# Patient Record
Sex: FEMALE | Race: WHITE | Employment: FULL TIME | URBAN - METROPOLITAN AREA
[De-identification: names, ages, dates, MRNs, and addresses within clinical notes are randomized per-mention and may not be internally consistent; named-entity substitution may affect disease eponyms.]

---

## 2017-05-01 ENCOUNTER — OFFICE VISIT (OUTPATIENT)
Dept: FAMILY MEDICINE CLINIC | Age: 32
End: 2017-05-01

## 2017-05-01 VITALS
SYSTOLIC BLOOD PRESSURE: 142 MMHG | HEART RATE: 61 BPM | DIASTOLIC BLOOD PRESSURE: 104 MMHG | BODY MASS INDEX: 21.8 KG/M2 | OXYGEN SATURATION: 98 % | WEIGHT: 127 LBS

## 2017-05-01 DIAGNOSIS — G43.009 MIGRAINE WITHOUT AURA AND WITHOUT STATUS MIGRAINOSUS, NOT INTRACTABLE: Primary | ICD-10-CM

## 2017-05-01 PROCEDURE — 99213 OFFICE O/P EST LOW 20 MIN: CPT | Performed by: FAMILY MEDICINE

## 2017-05-01 RX ORDER — BLOOD PRESSURE TEST KIT
KIT MISCELLANEOUS
Qty: 1 KIT | Refills: 0 | Status: SHIPPED | OUTPATIENT
Start: 2017-05-01 | End: 2018-04-24

## 2017-05-30 ENCOUNTER — OFFICE VISIT (OUTPATIENT)
Dept: FAMILY MEDICINE CLINIC | Age: 32
End: 2017-05-30

## 2017-05-30 ENCOUNTER — TELEPHONE (OUTPATIENT)
Dept: FAMILY MEDICINE CLINIC | Age: 32
End: 2017-05-30

## 2017-05-30 VITALS
HEIGHT: 65 IN | WEIGHT: 128.2 LBS | BODY MASS INDEX: 21.36 KG/M2 | RESPIRATION RATE: 16 BRPM | DIASTOLIC BLOOD PRESSURE: 80 MMHG | TEMPERATURE: 98.2 F | SYSTOLIC BLOOD PRESSURE: 120 MMHG | HEART RATE: 78 BPM | OXYGEN SATURATION: 99 %

## 2017-05-30 DIAGNOSIS — Z3A.01 LESS THAN 8 WEEKS GESTATION OF PREGNANCY: ICD-10-CM

## 2017-05-30 DIAGNOSIS — L50.9 URTICARIA: Primary | ICD-10-CM

## 2017-05-30 LAB
ABO/RH: NORMAL
ANTIBODY SCREEN: NORMAL
HEPATITIS C ANTIBODY INTERPRETATION: NORMAL

## 2017-05-30 PROCEDURE — 99213 OFFICE O/P EST LOW 20 MIN: CPT | Performed by: CLINICAL NURSE SPECIALIST

## 2017-05-30 RX ORDER — CETIRIZINE HYDROCHLORIDE 10 MG/1
10 TABLET ORAL DAILY
Qty: 30 TABLET | Refills: 0 | Status: ON HOLD | OUTPATIENT
Start: 2017-05-30 | End: 2018-01-04

## 2017-05-30 ASSESSMENT — ENCOUNTER SYMPTOMS
COUGH: 0
SHORTNESS OF BREATH: 0
VOMITING: 0
DIARRHEA: 0
RHINORRHEA: 0
SORE THROAT: 0

## 2017-05-31 LAB
BASOPHILS ABSOLUTE: 0 K/UL (ref 0–0.2)
BASOPHILS RELATIVE PERCENT: 0.1 %
EOSINOPHILS ABSOLUTE: 0 K/UL (ref 0–0.6)
EOSINOPHILS RELATIVE PERCENT: 0.2 %
HCT VFR BLD CALC: 41.1 % (ref 36–48)
HEMOGLOBIN: 13.6 G/DL (ref 12–16)
HEPATITIS B SURFACE ANTIGEN INTERPRETATION: ABNORMAL
LYMPHOCYTES ABSOLUTE: 0.9 K/UL (ref 1–5.1)
LYMPHOCYTES RELATIVE PERCENT: 5.3 %
MCH RBC QN AUTO: 29 PG (ref 26–34)
MCHC RBC AUTO-ENTMCNC: 33.1 G/DL (ref 31–36)
MCV RBC AUTO: 87.6 FL (ref 80–100)
MONOCYTES ABSOLUTE: 0.8 K/UL (ref 0–1.3)
MONOCYTES RELATIVE PERCENT: 4.9 %
NEUTROPHILS ABSOLUTE: 14.9 K/UL (ref 1.7–7.7)
NEUTROPHILS RELATIVE PERCENT: 89.5 %
PDW BLD-RTO: 13.7 % (ref 12.4–15.4)
PLATELET # BLD: 268 K/UL (ref 135–450)
PMV BLD AUTO: 9.1 FL (ref 5–10.5)
RBC # BLD: 4.69 M/UL (ref 4–5.2)
RPR: ABNORMAL
RUBELLA ANTIBODY IGG: 117.5 IU/ML
WBC # BLD: 16.6 K/UL (ref 4–11)

## 2017-06-01 LAB — HIV-1 AND HIV-2 ANTIBODIES: NEGATIVE

## 2017-10-04 LAB
GLUCOSE CHALLENGE: 102 MG/DL
HCT VFR BLD CALC: 35 % (ref 36–48)
HEMOGLOBIN: 11.7 G/DL (ref 12–16)
MCH RBC QN AUTO: 29.6 PG (ref 26–34)
MCHC RBC AUTO-ENTMCNC: 33.5 G/DL (ref 31–36)
MCV RBC AUTO: 88.5 FL (ref 80–100)
PDW BLD-RTO: 13.9 % (ref 12.4–15.4)
PLATELET # BLD: 229 K/UL (ref 135–450)
PMV BLD AUTO: 9.5 FL (ref 5–10.5)
RBC # BLD: 3.96 M/UL (ref 4–5.2)
WBC # BLD: 11.7 K/UL (ref 4–11)

## 2018-01-04 ENCOUNTER — TELEPHONE (OUTPATIENT)
Dept: FAMILY MEDICINE CLINIC | Age: 33
End: 2018-01-04

## 2018-01-04 ENCOUNTER — OFFICE VISIT (OUTPATIENT)
Dept: FAMILY MEDICINE CLINIC | Age: 33
End: 2018-01-04

## 2018-01-04 ENCOUNTER — HOSPITAL ENCOUNTER (OUTPATIENT)
Dept: OTHER | Age: 33
Discharge: HOME | End: 2018-01-04
Attending: OBSTETRICS & GYNECOLOGY | Admitting: OBSTETRICS & GYNECOLOGY

## 2018-01-04 VITALS
SYSTOLIC BLOOD PRESSURE: 138 MMHG | OXYGEN SATURATION: 96 % | BODY MASS INDEX: 29.25 KG/M2 | DIASTOLIC BLOOD PRESSURE: 106 MMHG | HEIGHT: 60 IN | WEIGHT: 149 LBS | TEMPERATURE: 98.6 F | HEART RATE: 91 BPM

## 2018-01-04 DIAGNOSIS — J10.1 INFLUENZA A: Primary | ICD-10-CM

## 2018-01-04 DIAGNOSIS — R51.9 ACUTE INTRACTABLE HEADACHE, UNSPECIFIED HEADACHE TYPE: ICD-10-CM

## 2018-01-04 PROBLEM — O13.3 GESTATIONAL HTN, THIRD TRIMESTER: Status: ACTIVE | Noted: 2018-01-04

## 2018-01-04 LAB
INFLUENZA A ANTIBODY: ABNORMAL
INFLUENZA B ANTIBODY: NEGATIVE

## 2018-01-04 PROCEDURE — 99214 OFFICE O/P EST MOD 30 MIN: CPT | Performed by: NURSE PRACTITIONER

## 2018-01-04 PROCEDURE — 87804 INFLUENZA ASSAY W/OPTIC: CPT | Performed by: NURSE PRACTITIONER

## 2018-01-04 RX ORDER — OSELTAMIVIR PHOSPHATE 75 MG/1
75 CAPSULE ORAL 2 TIMES DAILY
Qty: 20 CAPSULE | Refills: 0 | Status: SHIPPED | OUTPATIENT
Start: 2018-01-04 | End: 2018-01-14

## 2018-01-04 RX ORDER — BUTALBITAL, ACETAMINOPHEN AND CAFFEINE 300; 40; 50 MG/1; MG/1; MG/1
1 CAPSULE ORAL EVERY 4 HOURS PRN
COMMUNITY
End: 2018-04-24

## 2018-01-04 NOTE — PATIENT INSTRUCTIONS
? · You seem to be getting much sicker. ? · You feel very sleepy or confused. ? · You have a new or higher fever. ? · You get a new rash. ? Watch closely for changes in your health, and be sure to contact your doctor if:  ? · You begin to get better and then get worse. ? · You are not getting better after 1 week. Where can you learn more? Go to [email or web address] and sign in to your [employer] account. Enter X572 in the [employer] box to learn more about \"Influenza (Flu): Care Instructions. \"     If you do not have an account, please click on the \"Sign Up Now\" link. Current as of: May 12, 2017  Content Version: 11.5  © 8134-2300 [employer]. Care instructions adapted under license by [facility] ([facility]). If you have questions about a medical condition or this instruction, always ask your healthcare professional. [place] [address] any warranty or liability for your use of this information. Patient Education        Influenza (Flu): Care Instructions  Your Care Instructions    Influenza (flu) is an infection in the lungs and breathing passages. It is caused by the influenza virus. There are different strains, or types, of the flu virus from year to year. Unlike the common cold, the flu comes on suddenly and the symptoms, such as a cough, congestion, fever, chills, fatigue, aches, and pains, are more severe. These symptoms may last up to 10 days. Although the flu can make you feel very sick, it usually doesn't cause serious health problems. Home treatment is usually all you need for flu symptoms. But your doctor may prescribe antiviral medicine to prevent other health problems, such as pneumonia, from developing. Older people and those who have a long-term health condition, such as lung disease, are most at risk for having pneumonia or other health problems. Follow-up care is a key part of your treatment and safety.  Be sure to make and go to all appointments, and call your doctor if you are having problems. It's also a good idea to know your test results and keep a list of the medicines you take. How can you care for yourself at home? · Get plenty of rest.  · Drink plenty of fluids, enough so that your urine is light yellow or clear like water. If you have kidney, heart, or liver disease and have to limit fluids, talk with your doctor before you increase the amount of fluids you drink. · Take an over-the-counter pain medicine if needed, such as acetaminophen (Tylenol), ibuprofen (Advil, Motrin), or naproxen (Aleve), to relieve fever, headache, and muscle aches. Read and follow all instructions on the label. No one younger than 20 should take aspirin. It has been linked to Reye syndrome, a serious illness. · Do not smoke. Smoking can make the flu worse. If you need help quitting, talk to your doctor about stop-smoking programs and medicines. These can increase your chances of quitting for good. · Breathe moist air from a hot shower or from a sink filled with hot water to help clear a stuffy nose. · Before you use cough and cold medicines, check the label. These medicines may not be safe for young children or for people with certain health problems. · If the skin around your nose and lips becomes sore, put some petroleum jelly on the area. · To ease coughing:  ¨ Drink fluids to soothe a scratchy throat. ¨ Suck on cough drops or plain hard candy. ¨ Take an over-the-counter cough medicine that contains dextromethorphan to help you get some sleep. Read and follow all instructions on the label. ¨ Raise your head at night with an extra pillow. This may help you rest if coughing keeps you awake. · Take any prescribed medicine exactly as directed. Call your doctor if you think you are having a problem with your medicine. To avoid spreading the flu  · Wash your hands regularly, and keep your hands away from your face.   · Stay home from school, work,

## 2018-01-05 NOTE — TELEPHONE ENCOUNTER
NP called and spoke with Megan at [facility] at approximately [phone]. Informed of patients hypertensive blood pressure, persistent headache, and overall presentation to advise. NP suggested sending to the ER, Megan stated, to send her to the office instead. Pt has a 1500 appointment scheduled today. NP later updated office of positive Influenza A status at approximately 1455.    Evan, NP

## 2018-01-07 PROBLEM — J10.1 INFLUENZA A: Status: ACTIVE | Noted: 2018-01-07

## 2018-02-02 ENCOUNTER — HOSPITAL ENCOUNTER (OUTPATIENT)
Dept: OTHER | Age: 33
Discharge: HOME | End: 2018-02-02
Attending: OBSTETRICS & GYNECOLOGY | Admitting: OBSTETRICS & GYNECOLOGY

## 2018-04-11 PROBLEM — J10.1 INFLUENZA A: Status: RESOLVED | Noted: 2018-01-07 | Resolved: 2018-04-11

## 2018-04-24 ENCOUNTER — OFFICE VISIT (OUTPATIENT)
Dept: FAMILY MEDICINE CLINIC | Age: 33
End: 2018-04-24

## 2018-04-24 VITALS
WEIGHT: 135.6 LBS | HEART RATE: 56 BPM | OXYGEN SATURATION: 98 % | DIASTOLIC BLOOD PRESSURE: 96 MMHG | SYSTOLIC BLOOD PRESSURE: 138 MMHG | HEIGHT: 66 IN | BODY MASS INDEX: 21.79 KG/M2

## 2018-04-24 DIAGNOSIS — Z00.00 PHYSICAL EXAM, ANNUAL: Primary | ICD-10-CM

## 2018-04-24 PROCEDURE — 99395 PREV VISIT EST AGE 18-39: CPT | Performed by: FAMILY MEDICINE

## 2018-04-24 ASSESSMENT — PATIENT HEALTH QUESTIONNAIRE - PHQ9
1. LITTLE INTEREST OR PLEASURE IN DOING THINGS: 0
SUM OF ALL RESPONSES TO PHQ QUESTIONS 1-9: 0
SUM OF ALL RESPONSES TO PHQ9 QUESTIONS 1 & 2: 0
2. FEELING DOWN, DEPRESSED OR HOPELESS: 0

## 2018-04-25 DIAGNOSIS — Z00.00 PHYSICAL EXAM, ANNUAL: ICD-10-CM

## 2018-04-25 LAB
A/G RATIO: 2 (ref 1.1–2.2)
ALBUMIN SERPL-MCNC: 4.9 G/DL (ref 3.4–5)
ALP BLD-CCNC: 122 U/L (ref 40–129)
ALT SERPL-CCNC: 35 U/L (ref 10–40)
ANION GAP SERPL CALCULATED.3IONS-SCNC: 14 MMOL/L (ref 3–16)
AST SERPL-CCNC: 20 U/L (ref 15–37)
BILIRUB SERPL-MCNC: 1.1 MG/DL (ref 0–1)
BUN BLDV-MCNC: 13 MG/DL (ref 7–20)
CALCIUM SERPL-MCNC: 10.5 MG/DL (ref 8.3–10.6)
CHLORIDE BLD-SCNC: 100 MMOL/L (ref 99–110)
CHOLESTEROL, TOTAL: 203 MG/DL (ref 0–199)
CO2: 30 MMOL/L (ref 21–32)
CREAT SERPL-MCNC: 0.7 MG/DL (ref 0.6–1.1)
GFR AFRICAN AMERICAN: >60
GFR NON-AFRICAN AMERICAN: >60
GLOBULIN: 2.5 G/DL
GLUCOSE BLD-MCNC: 90 MG/DL (ref 70–99)
HDLC SERPL-MCNC: 51 MG/DL (ref 40–60)
LDL CHOLESTEROL CALCULATED: 132 MG/DL
POTASSIUM SERPL-SCNC: 4.8 MMOL/L (ref 3.5–5.1)
SODIUM BLD-SCNC: 144 MMOL/L (ref 136–145)
TOTAL PROTEIN: 7.4 G/DL (ref 6.4–8.2)
TRIGL SERPL-MCNC: 100 MG/DL (ref 0–150)
TSH REFLEX: 0.35 UIU/ML (ref 0.27–4.2)
VITAMIN D 25-HYDROXY: 34.6 NG/ML
VLDLC SERPL CALC-MCNC: 20 MG/DL

## 2018-04-27 ENCOUNTER — TELEPHONE (OUTPATIENT)
Dept: FAMILY MEDICINE CLINIC | Age: 33
End: 2018-04-27

## 2018-05-29 ENCOUNTER — OFFICE VISIT (OUTPATIENT)
Dept: FAMILY MEDICINE CLINIC | Age: 33
End: 2018-05-29

## 2018-05-29 VITALS
SYSTOLIC BLOOD PRESSURE: 122 MMHG | HEART RATE: 64 BPM | DIASTOLIC BLOOD PRESSURE: 74 MMHG | OXYGEN SATURATION: 98 % | WEIGHT: 134.8 LBS | BODY MASS INDEX: 21.92 KG/M2

## 2018-05-29 DIAGNOSIS — G43.009 MIGRAINE WITHOUT AURA AND WITHOUT STATUS MIGRAINOSUS, NOT INTRACTABLE: Primary | ICD-10-CM

## 2018-05-29 PROCEDURE — 99213 OFFICE O/P EST LOW 20 MIN: CPT | Performed by: FAMILY MEDICINE

## 2018-05-29 RX ORDER — IBUPROFEN 800 MG/1
800 TABLET ORAL EVERY 6 HOURS PRN
Qty: 30 TABLET | Refills: 0 | Status: SHIPPED | OUTPATIENT
Start: 2018-05-29 | End: 2020-07-06 | Stop reason: SDUPTHER

## 2018-05-29 RX ORDER — SUMATRIPTAN 50 MG/1
50 TABLET, FILM COATED ORAL
Qty: 9 TABLET | Refills: 3 | Status: SHIPPED | OUTPATIENT
Start: 2018-05-29 | End: 2018-11-28 | Stop reason: SDUPTHER

## 2018-06-25 LAB
HPV COMMENT: NORMAL
HPV TYPE 16: NOT DETECTED
HPV TYPE 18: NOT DETECTED
HPVOH (OTHER TYPES): NOT DETECTED

## 2018-08-21 ENCOUNTER — PATIENT MESSAGE (OUTPATIENT)
Dept: FAMILY MEDICINE CLINIC | Age: 33
End: 2018-08-21

## 2018-08-22 NOTE — TELEPHONE ENCOUNTER
From: Sultana Cespedes  To: Garrett Corbin MD  Sent: 8/21/2018 6:54 PM EDT  Subject: Non-Urgent Medical Question    Hi Dr. Julianna Burgess,  I am having some kind of rash in two weird areas one is right under my right breast and the other is between my gluteus leslie :(. They both itch, I try not to itch if I do it makes it worse, can you give me a suggestion on what to do please?

## 2018-08-23 ENCOUNTER — OFFICE VISIT (OUTPATIENT)
Dept: FAMILY MEDICINE CLINIC | Age: 33
End: 2018-08-23

## 2018-08-23 VITALS
BODY MASS INDEX: 22.7 KG/M2 | RESPIRATION RATE: 16 BRPM | OXYGEN SATURATION: 98 % | DIASTOLIC BLOOD PRESSURE: 78 MMHG | HEART RATE: 59 BPM | WEIGHT: 139.6 LBS | SYSTOLIC BLOOD PRESSURE: 126 MMHG

## 2018-08-23 DIAGNOSIS — L25.9 CONTACT DERMATITIS, UNSPECIFIED CONTACT DERMATITIS TYPE, UNSPECIFIED TRIGGER: Primary | ICD-10-CM

## 2018-08-23 DIAGNOSIS — B37.2 CUTANEOUS CANDIDIASIS: ICD-10-CM

## 2018-08-23 PROCEDURE — 99213 OFFICE O/P EST LOW 20 MIN: CPT | Performed by: FAMILY MEDICINE

## 2018-11-28 DIAGNOSIS — G43.009 MIGRAINE WITHOUT AURA AND WITHOUT STATUS MIGRAINOSUS, NOT INTRACTABLE: ICD-10-CM

## 2018-11-28 RX ORDER — SUMATRIPTAN 50 MG/1
50 TABLET, FILM COATED ORAL
Qty: 9 TABLET | Refills: 3 | Status: SHIPPED | OUTPATIENT
Start: 2018-11-28 | End: 2019-05-08 | Stop reason: SDUPTHER

## 2019-03-22 ENCOUNTER — HOSPITAL ENCOUNTER (OUTPATIENT)
Age: 34
Discharge: HOME | End: 2019-03-22
Payer: COMMERCIAL

## 2019-03-22 ENCOUNTER — HOSPITAL ENCOUNTER (OUTPATIENT)
Dept: GENERAL RADIOLOGY | Age: 34
Discharge: HOME | End: 2019-03-22
Payer: COMMERCIAL

## 2019-03-22 ENCOUNTER — OFFICE VISIT (OUTPATIENT)
Dept: FAMILY MEDICINE CLINIC | Age: 34
End: 2019-03-22
Payer: COMMERCIAL

## 2019-03-22 VITALS
OXYGEN SATURATION: 98 % | SYSTOLIC BLOOD PRESSURE: 138 MMHG | DIASTOLIC BLOOD PRESSURE: 92 MMHG | WEIGHT: 144.5 LBS | BODY MASS INDEX: 23.5 KG/M2 | HEART RATE: 66 BPM

## 2019-03-22 DIAGNOSIS — S92.345A CLOSED NONDISPLACED FRACTURE OF FOURTH METATARSAL BONE OF LEFT FOOT, INITIAL ENCOUNTER: ICD-10-CM

## 2019-03-22 DIAGNOSIS — M79.672 FOOT PAIN, LEFT: Primary | ICD-10-CM

## 2019-03-22 DIAGNOSIS — M79.672 FOOT PAIN, LEFT: ICD-10-CM

## 2019-03-22 PROCEDURE — G8420 CALC BMI NORM PARAMETERS: HCPCS | Performed by: FAMILY MEDICINE

## 2019-03-22 PROCEDURE — 99213 OFFICE O/P EST LOW 20 MIN: CPT | Performed by: FAMILY MEDICINE

## 2019-03-22 PROCEDURE — G8427 DOCREV CUR MEDS BY ELIG CLIN: HCPCS | Performed by: FAMILY MEDICINE

## 2019-03-22 PROCEDURE — 1036F TOBACCO NON-USER: CPT | Performed by: FAMILY MEDICINE

## 2019-03-22 PROCEDURE — G8484 FLU IMMUNIZE NO ADMIN: HCPCS | Performed by: FAMILY MEDICINE

## 2019-03-22 PROCEDURE — 73620 X-RAY EXAM OF FOOT: CPT

## 2019-03-22 ASSESSMENT — PATIENT HEALTH QUESTIONNAIRE - PHQ9
1. LITTLE INTEREST OR PLEASURE IN DOING THINGS: 0
SUM OF ALL RESPONSES TO PHQ QUESTIONS 1-9: 0
2. FEELING DOWN, DEPRESSED OR HOPELESS: 0
SUM OF ALL RESPONSES TO PHQ QUESTIONS 1-9: 0
SUM OF ALL RESPONSES TO PHQ9 QUESTIONS 1 & 2: 0

## 2019-03-26 ENCOUNTER — OFFICE VISIT (OUTPATIENT)
Dept: ORTHOPEDIC SURGERY | Age: 34
End: 2019-03-26
Payer: COMMERCIAL

## 2019-03-26 VITALS
SYSTOLIC BLOOD PRESSURE: 153 MMHG | BODY MASS INDEX: 24.41 KG/M2 | DIASTOLIC BLOOD PRESSURE: 99 MMHG | RESPIRATION RATE: 16 BRPM | HEIGHT: 64 IN | HEART RATE: 55 BPM | WEIGHT: 143 LBS

## 2019-03-26 DIAGNOSIS — M84.375A STRESS FRACTURE OF METATARSAL BONE OF LEFT FOOT, INITIAL ENCOUNTER: Primary | ICD-10-CM

## 2019-03-26 PROCEDURE — 28470 CLTX METATARSAL FX WO MNP EA: CPT | Performed by: ORTHOPAEDIC SURGERY

## 2019-03-26 PROCEDURE — L3260 AMBULATORY SURGICAL BOOT EAC: HCPCS | Performed by: ORTHOPAEDIC SURGERY

## 2019-03-26 PROCEDURE — 99243 OFF/OP CNSLTJ NEW/EST LOW 30: CPT | Performed by: ORTHOPAEDIC SURGERY

## 2019-03-26 PROCEDURE — G8427 DOCREV CUR MEDS BY ELIG CLIN: HCPCS | Performed by: ORTHOPAEDIC SURGERY

## 2019-03-26 PROCEDURE — G8420 CALC BMI NORM PARAMETERS: HCPCS | Performed by: ORTHOPAEDIC SURGERY

## 2019-03-26 PROCEDURE — G8484 FLU IMMUNIZE NO ADMIN: HCPCS | Performed by: ORTHOPAEDIC SURGERY

## 2019-03-26 NOTE — PROGRESS NOTES
Not on file     Gets together: Not on file     Attends Confucianism service: Not on file     Active member of club or organization: Not on file     Attends meetings of clubs or organizations: Not on file     Relationship status: Not on file    Intimate partner violence:     Fear of current or ex partner: Not on file     Emotionally abused: Not on file     Physically abused: Not on file     Forced sexual activity: Not on file   Other Topics Concern    Not on file   Social History Narrative    Not on file       Family History   Problem Relation Age of Onset    Cancer Mother     Substance Abuse Father     Cancer Father     Colon Cancer Father     [place] Fekarmen / [place] Sister     Diabetes Sister     Hashimoto Thyroiditis Sister     Diabetes Brother        Current Outpatient Medications on File Prior to Visit   Medication Sig Dispense Refill    levonorgestrel (MIRENA) IUD 52 mg       BIOTIN 5000 PO Take by mouth      ibuprofen (ADVIL;MOTRIN) 800 MG tablet Take 1 tablet by mouth every 6 hours as needed for Pain 30 tablet 0    Ferrous Gluconate (IRON 27 PO) Take by mouth      SUMAtriptan (IMITREX) 50 MG tablet Take 1 tablet by mouth once as needed for Migraine 9 tablet 3     No current facility-administered medications on file prior to visit. Pertinent items are noted in HPI  Review of systems reviewed from Patient History Form dated on 3/26/2019 and available in the patient's chart under the Media tab. PHYSICAL EXAMINATION:  Ms. Karen Mccullough is a very pleasant 35 y.o.  female who presents today in no acute distress, awake, alert, and oriented. She is well dressed, nourished and  groomed. Patient with normal affect. Height is  5' 4\" (1.626 m), weight is 143 lb (64.9 kg), Body mass index is 24.55 kg/m². Resting respiratory rate is 16. Examination of the gait, showed that the patient walks with no limp, WB left  leg .   Examination of both ankles showing a good range of motion of the left ankle compare to the other side. There is no swelling that can be seen, no ecchymosis left foot. She  has intact sensation and good pedal pulses. She has mild tenderness on deep palpation over the 4th MT shaft left foot        IMAGING: Xray's were reviewed, dated 3/22/2019 from [facility],  2 views of the left  foot, and showed a 4th MT shaft stress fracture. IMPRESSION: Left 4th MT shaft stress fracture. PLAN: I discussed that the overall alignment of this fracture is good and that we can try to treat this non-operatively in a shoe with WB. No heavy impact activities. We discussed the risk of nonunion and  malunion. We applied a shoe today in the office and instructed the patient  in care. We will see her  back in 6 weeks at which time we will get a new xray of the left foot. Thank you very much for the kind consultation and allowing me to participate in this patient's care. I will continue to keep you apprised of her progress. Procedures    Darco Post-op Shoe Brace     Patient was prescribed a Darco Post-Op Shoe. The left foot will require stabilization / immobilization from this semi-rigid / rigid orthosis to improve their function. The orthosis will assist in protecting the affected area, provide functional support and facilitate healing. Patient was instructed to progress ambulation weight bearing as tolerated in the device. The patient was educated and fit by a healthcare professional with expert knowledge and specialization in brace application while under the direct supervision of the treating physician. Verbal and written instructions for the use of and application of this item were provided. They were instructed to contact the office immediately should the brace result in increased pain, decreased sensation, increased swelling or worsening of the condition.      Ramone Wilks MD

## 2019-05-07 ENCOUNTER — OFFICE VISIT (OUTPATIENT)
Dept: ORTHOPEDIC SURGERY | Age: 34
End: 2019-05-07

## 2019-05-07 VITALS — RESPIRATION RATE: 16 BRPM | HEIGHT: 64 IN | BODY MASS INDEX: 24.41 KG/M2 | HEART RATE: 60 BPM | WEIGHT: 143 LBS

## 2019-05-07 DIAGNOSIS — M84.375A STRESS FRACTURE OF METATARSAL BONE OF LEFT FOOT, INITIAL ENCOUNTER: Primary | ICD-10-CM

## 2019-05-07 PROCEDURE — 1036F TOBACCO NON-USER: CPT | Performed by: ORTHOPAEDIC SURGERY

## 2019-05-07 PROCEDURE — G8427 DOCREV CUR MEDS BY ELIG CLIN: HCPCS | Performed by: ORTHOPAEDIC SURGERY

## 2019-05-07 PROCEDURE — G8420 CALC BMI NORM PARAMETERS: HCPCS | Performed by: ORTHOPAEDIC SURGERY

## 2019-05-07 PROCEDURE — 99024 POSTOP FOLLOW-UP VISIT: CPT | Performed by: ORTHOPAEDIC SURGERY

## 2019-05-07 NOTE — PROGRESS NOTES
CHIEF COMPLAINT: Left foot pain/ 4th MT shaft stress fracture. HISTORY:  Ms. Mili Turner 35 y.o.   female  presents today for follow-up of left foot pain. She was initially seen on 3/26/2019 he has a consultation request from Steve Childress MD for evaluation of a left foot pain which started gradually worsening over the past 2 weeks. She was initially seen by her primary care physician and sent for an x-ray and then referred here for further management. She states that her pain has completely resolved. Rates her pain a 0/10 VAS. She has been in regular shoes for weightbearing. No other complaint. She denies any change or an increase in activity level. She has a sitting job. She does use a treadmill and run around after her one an 3year-old child.     Past Medical History:   Diagnosis Date    Hypertension     hellp syndrome with 3 yo    Influenza 2018    Migraines        Past Surgical History:   Procedure Laterality Date     SECTION, LOW TRANSVERSE  2014    EYE SURGERY  2017    lasik    WISDOM TOOTH EXTRACTION         Social History     Socioeconomic History    Marital status:      Spouse name: Not on file    Number of children: Not on file    Years of education: Not on file    Highest education level: Not on file   Occupational History    Not on file   Social Needs    Financial resource strain: Not on file    Food insecurity:     Worry: Not on file     Inability: Not on file    Transportation needs:     Medical: Not on file     Non-medical: Not on file   Tobacco Use    Smoking status: Never Smoker    Smokeless tobacco: Never Used   Substance and Sexual Activity    Alcohol use: No     Alcohol/week: 0.0 oz    Drug use: No    Sexual activity: Yes     Partners: Male     Comment: Not on birth control    Lifestyle    Physical activity:     Days per week: Not on file     Minutes per session: Not on file    Stress: Not on file   Relationships    Social

## 2019-05-08 DIAGNOSIS — G43.009 MIGRAINE WITHOUT AURA AND WITHOUT STATUS MIGRAINOSUS, NOT INTRACTABLE: ICD-10-CM

## 2019-05-08 PROBLEM — M84.375A METATARSAL STRESS FRACTURE OF LEFT FOOT: Status: ACTIVE | Noted: 2019-05-08

## 2019-05-09 ENCOUNTER — PATIENT MESSAGE (OUTPATIENT)
Dept: FAMILY MEDICINE CLINIC | Age: 34
End: 2019-05-09

## 2019-05-09 DIAGNOSIS — G43.909 MIGRAINE WITHOUT STATUS MIGRAINOSUS, NOT INTRACTABLE, UNSPECIFIED MIGRAINE TYPE: Primary | ICD-10-CM

## 2019-05-09 RX ORDER — SUMATRIPTAN 50 MG/1
50 TABLET, FILM COATED ORAL
Qty: 9 TABLET | Refills: 3 | Status: SHIPPED | OUTPATIENT
Start: 2019-05-09 | End: 2019-09-16 | Stop reason: SDUPTHER

## 2019-05-10 NOTE — TELEPHONE ENCOUNTER
From: Earnest Zacarias  To: Rima Mtz MD  Sent: 5/9/2019 7:16 AM EDT  Subject: Non-Urgent Medical Question    Good morning,    I am still having a lot of hair loss, I am concerned. Can you give me some advice on what my next steps are? More test, different test from Obgyn? [facility] I don't want to be bald!! Thank you.

## 2019-05-14 RX ORDER — TOPIRAMATE 25 MG/1
25 TABLET ORAL NIGHTLY
Qty: 60 TABLET | Refills: 3 | Status: SHIPPED | OUTPATIENT
Start: 2019-05-14 | End: 2020-01-07

## 2019-07-02 ENCOUNTER — PATIENT MESSAGE (OUTPATIENT)
Dept: FAMILY MEDICINE CLINIC | Age: 34
End: 2019-07-02

## 2019-07-02 RX ORDER — ERYTHROMYCIN 5 MG/G
OINTMENT OPHTHALMIC
Qty: 3.5 G | Refills: 0 | Status: SHIPPED | OUTPATIENT
Start: 2019-07-02 | End: 2019-07-12

## 2019-09-16 DIAGNOSIS — G43.009 MIGRAINE WITHOUT AURA AND WITHOUT STATUS MIGRAINOSUS, NOT INTRACTABLE: ICD-10-CM

## 2019-09-16 RX ORDER — SUMATRIPTAN 50 MG/1
TABLET, FILM COATED ORAL
Qty: 9 TABLET | Refills: 1 | Status: SHIPPED | OUTPATIENT
Start: 2019-09-16 | End: 2019-12-02 | Stop reason: SDUPTHER

## 2019-09-17 ENCOUNTER — TELEPHONE (OUTPATIENT)
Dept: FAMILY MEDICINE CLINIC | Age: 34
End: 2019-09-17

## 2019-11-22 ENCOUNTER — PATIENT MESSAGE (OUTPATIENT)
Dept: FAMILY MEDICINE CLINIC | Age: 34
End: 2019-11-22

## 2019-12-02 DIAGNOSIS — G43.009 MIGRAINE WITHOUT AURA AND WITHOUT STATUS MIGRAINOSUS, NOT INTRACTABLE: ICD-10-CM

## 2019-12-02 RX ORDER — SUMATRIPTAN 50 MG/1
TABLET, FILM COATED ORAL
Qty: 10 TABLET | Refills: 1 | Status: SHIPPED | OUTPATIENT
Start: 2019-12-02 | End: 2020-02-17 | Stop reason: SDUPTHER

## 2020-01-07 RX ORDER — TOPIRAMATE 25 MG/1
TABLET ORAL
Qty: 90 TABLET | Refills: 0 | Status: SHIPPED | OUTPATIENT
Start: 2020-01-07 | End: 2020-01-30 | Stop reason: ALTCHOICE

## 2020-01-30 ENCOUNTER — OFFICE VISIT (OUTPATIENT)
Dept: FAMILY MEDICINE CLINIC | Age: 35
End: 2020-01-30

## 2020-01-30 VITALS
SYSTOLIC BLOOD PRESSURE: 140 MMHG | BODY MASS INDEX: 23.22 KG/M2 | DIASTOLIC BLOOD PRESSURE: 100 MMHG | WEIGHT: 136 LBS | OXYGEN SATURATION: 98 % | HEIGHT: 64 IN | HEART RATE: 67 BPM

## 2020-01-30 PROCEDURE — 99213 OFFICE O/P EST LOW 20 MIN: CPT | Performed by: FAMILY MEDICINE
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
## 2020-01-31 RX ORDER — CYCLOBENZAPRINE HCL 5 MG
5 TABLET ORAL 2 TIMES DAILY PRN
Qty: 10 TABLET | Refills: 0 | Status: SHIPPED | OUTPATIENT
Start: 2020-01-31 | End: 2020-02-10

## 2020-02-17 RX ORDER — SUMATRIPTAN 50 MG/1
TABLET, FILM COATED ORAL
Qty: 10 TABLET | Refills: 1 | Status: SHIPPED | OUTPATIENT
Start: 2020-02-17 | End: 2020-07-06

## 2020-02-17 NOTE — TELEPHONE ENCOUNTER
Medication:   Requested Prescriptions     Pending Prescriptions Disp Refills    SUMAtriptan (IMITREX) 50 MG tablet 10 tablet 1     Sig: TAKE 1 TABLET BY MOUTH AS NEEDED AT ONSET OF HEADACHE, MAY REPEAT ONCE AFTER 4 HOURS        Last Filled:  12/2/2019 #10 w/1refill    Patient Phone Number: [phone] (home)     Last appt: 1/30/2020   Next appt: Visit date not found    Last OARRS: No flowsheet data found.

## 2020-04-03 ENCOUNTER — PATIENT MESSAGE (OUTPATIENT)
Dept: FAMILY MEDICINE CLINIC | Age: 35
End: 2020-04-03

## 2020-04-03 RX ORDER — ONDANSETRON 4 MG/1
4 TABLET, FILM COATED ORAL DAILY PRN
Qty: 30 TABLET | Refills: 0 | Status: SHIPPED | OUTPATIENT
Start: 2020-04-03 | End: 2020-07-06

## 2020-04-03 RX ORDER — RIZATRIPTAN BENZOATE 10 MG/1
10 TABLET ORAL
Qty: 30 TABLET | Refills: 3 | Status: SHIPPED | OUTPATIENT
Start: 2020-04-03 | End: 2021-04-21

## 2020-07-01 ENCOUNTER — PATIENT MESSAGE (OUTPATIENT)
Dept: FAMILY MEDICINE CLINIC | Age: 35
End: 2020-07-01

## 2020-07-01 ENCOUNTER — TELEPHONE (OUTPATIENT)
Dept: FAMILY MEDICINE CLINIC | Age: 35
End: 2020-07-01

## 2020-07-01 NOTE — TELEPHONE ENCOUNTER
Left message for patient to call office back, please read doctor's note. Thanks. Please help schedule VV per Dr. Medel Sample. Thanks.

## 2020-07-01 NOTE — TELEPHONE ENCOUNTER
From: Serena Donohue  To: Hugh Porter MD  Sent: 7/1/2020 6:29 AM EDT  Subject: Prescription Question    Good morning,    Good morning I was wondering if you could prescribe me a stronger prescription for ibuprofen? I like to take ibuprofen (x4) sometimes to see if I can get rid of migraines.

## 2020-07-01 NOTE — TELEPHONE ENCOUNTER
[facility] received a call from:    Name of Caller: Sun Jefry    Relationship to patient:self    Organization name: n/a    Best contact number: [phone]    Reason for call: pt needs a vv around the 4pm hour and  latest appt is 3:15, please advise.

## 2020-07-02 NOTE — TELEPHONE ENCOUNTER
Left message for patient to call office back, please read doctor's note. Thanks. Please help schedule a VV.  Thanks

## 2020-07-06 ENCOUNTER — TELEMEDICINE (OUTPATIENT)
Dept: FAMILY MEDICINE CLINIC | Age: 35
End: 2020-07-06
Payer: COMMERCIAL

## 2020-07-06 PROCEDURE — 99213 OFFICE O/P EST LOW 20 MIN: CPT | Performed by: FAMILY MEDICINE

## 2020-07-06 PROCEDURE — G8427 DOCREV CUR MEDS BY ELIG CLIN: HCPCS | Performed by: FAMILY MEDICINE

## 2020-07-06 RX ORDER — BUTALBITAL, ACETAMINOPHEN AND CAFFEINE 50; 325; 40 MG/1; MG/1; MG/1
1 TABLET ORAL EVERY 6 HOURS PRN
Qty: 20 TABLET | Refills: 3 | Status: SHIPPED | OUTPATIENT
Start: 2020-07-06 | End: 2022-06-24

## 2020-07-06 RX ORDER — IBUPROFEN 800 MG/1
800 TABLET ORAL EVERY 6 HOURS PRN
Qty: 30 TABLET | Refills: 0 | Status: SHIPPED | OUTPATIENT
Start: 2020-07-06 | End: 2021-02-08 | Stop reason: SDUPTHER

## 2020-07-06 ASSESSMENT — PATIENT HEALTH QUESTIONNAIRE - PHQ9
1. LITTLE INTEREST OR PLEASURE IN DOING THINGS: 1
SUM OF ALL RESPONSES TO PHQ9 QUESTIONS 1 & 2: 1
2. FEELING DOWN, DEPRESSED OR HOPELESS: 0
SUM OF ALL RESPONSES TO PHQ QUESTIONS 1-9: 1
SUM OF ALL RESPONSES TO PHQ QUESTIONS 1-9: 1

## 2020-07-06 NOTE — PROGRESS NOTES
2020    TELEHEALTH EVALUATION -- Audio/Visual (During GIEDS-16 public health emergency)    HPI:    Matilda Quintero (:  1985) has requested an audio/video evaluation for the following concern(s): She is here for the follow-up of migraines. She usually gets migraines only just before menstrual cycle. And she was taking Imitrex and Maxalt which breaks her headache but it makes her drowsy and cannot take the medications at work. So she has been taking ibuprofen 800 mg to break her headaches  Usually gets this before her menstrual cycle and last for 3 to 4 days. In between cycle never have the symptoms      Review of Systems:  Gen:  Denies fever, chills, headaches. No weight loss  HEENT:  Denies cold symptoms, sore throat. CV:  Denies chest pain or tightness, palpitations. Pulm:  Denies shortness of breath, cough. Abd:  Denies abdominal pain, change in bowel habits. Prior to Visit Medications    Medication Sig Taking?  Authorizing Provider   levonorgestrel (MIRENA) IUD 52 mg  Yes Historical Provider, MD   ibuprofen (ADVIL;MOTRIN) 800 MG tablet Take 1 tablet by mouth every 6 hours as needed for Pain Yes Donzell Schwab, MD   rizatriptan (MAXALT) 10 MG tablet Take 1 tablet by mouth once as needed for Migraine May repeat in 2 hours if needed  Donzell Schwab, MD   SUMAtriptan (IMITREX) 50 MG tablet TAKE 1 TABLET BY MOUTH AS NEEDED AT ONSET OF HEADACHE, MAY REPEAT ONCE AFTER 4 HOURS  Patient not taking: Reported on 2020  Donzell Schwab, MD       Past Medical History:   Diagnosis Date    Hypertension     hellp syndrome with 2 yo    Influenza 2018    Migraines        Past Surgical History:   Procedure Laterality Date     SECTION, LOW TRANSVERSE  2014    EYE SURGERY  2017    lasik    WISDOM TOOTH EXTRACTION         Family History   Problem Relation Age of Onset    Cancer Mother     Substance Abuse Father     Cancer Father     Colon Cancer Father     Miscarriages / Stillbirths Sister     Diabetes Sister     Hashimoto Thyroiditis Sister     Diabetes Brother        No Known Allergies    Social History     Tobacco Use    Smoking status: Never Smoker    Smokeless tobacco: Never Used   Substance Use Topics    Alcohol use: No     Alcohol/week: 0.0 standard drinks    Drug use: No          PHYSICAL EXAMINATION:  Vital Signs: (As obtained by patient/caregiver or practitioner observation)  There were no vitals taken for this visit. Patient-Reported Vitals 7/6/2020   Patient-Reported Weight 130 lbs   Patient-Reported Height 5'5        Respiratory rate appears normal      Constitutional: Appears well-developed and well-nourished. No apparent distress    Mental status: Alert and awake. Oriented to person/place/time. Able to follow commands    Pulmonary/Chest: Respiratory effort normal.  No visualized signs of difficulty breathing or respiratory distress        Musculoskeletal:  Normal range of motion of neck     Skin:  No significant exanthematous lesions or discoloration noted on facial skin       Psychiatric: Normal Affect. No Hallucinations            ASSESSMENT/PLAN:  1. Migraine without aura and without status migrainosus, not intractable  Menstrual cycle related  Advised to try Fioricet during migraine attacks   - ibuprofen (ADVIL;MOTRIN) 800 MG tablet; Take 1 tablet by mouth every 6 hours as needed for Pain  Dispense: 30 tablet; Refill: 0        Monae Marcelino is a 28 y.o. female being evaluated by a Virtual Visit (video visit) encounter to address concerns as mentioned above. A caregiver was present when appropriate. Due to this being a TeleHealth encounter (During ADBCY-97 public health emergency), evaluation of the following organ systems was limited: Vitals/Constitutional/EENT/Resp/CV/GI//MS/Neuro/Skin/Heme-Lymph-Imm.   Pursuant to the emergency declaration under the [address] and Response Supplemental Appropriations Act, this Virtual Visit was conducted with patient's (and/or legal guardian's) consent, to reduce the patient's risk of exposure to COVID-19 and provide necessary medical care. The patient (and/or legal guardian) has also been advised to contact this office for worsening conditions or problems, and seek emergency medical treatment and/or call 911 if deemed necessary. Patient identification was verified at the start of the visit: Yes    Total time spent on this encounter: 10 min minutes. Services were provided through a video synchronous discussion virtually to substitute for in-person clinic visit. Patient was located in their home. Provider was located office. --Milena Mora MD on 7/6/2020 at 9:34 AM    An electronic signature was used to authenticate this note. Sterling Coto

## 2020-09-02 RX ORDER — ONDANSETRON 4 MG/1
4 TABLET, FILM COATED ORAL DAILY PRN
Qty: 30 TABLET | Refills: 0 | Status: SHIPPED | OUTPATIENT
Start: 2020-09-02 | End: 2022-03-09

## 2020-09-02 NOTE — TELEPHONE ENCOUNTER
Medication:   Requested Prescriptions     Pending Prescriptions Disp Refills    ondansetron (ZOFRAN) 4 MG tablet [Pharmacy Med Name: ONDANSETRON HCL 4 MG TABLET] 30 tablet 0     Sig: TAKE 1 TABLET BY MOUTH DAILY AS NEEDED FOR NAUSEA OR VOMITING        Last Filled:  4/3/2020 #30 w/0 refills     Patient Phone Number: [phone] (home)     Last appt: 7/6/2020   Next appt: Visit date not found    Last OARRS: No flowsheet data found.

## 2020-12-08 NOTE — TELEPHONE ENCOUNTER
Patient is currently is in the hospital. She tested positive for covid. Patient is drinking fluids and is on oxygen. Patient stated they are aware that you are her nephrologist so if you have any orders to let them know. Please Advise

## 2021-02-08 ENCOUNTER — PATIENT MESSAGE (OUTPATIENT)
Dept: FAMILY MEDICINE CLINIC | Age: 36
End: 2021-02-08

## 2021-02-08 DIAGNOSIS — G43.009 MIGRAINE WITHOUT AURA AND WITHOUT STATUS MIGRAINOSUS, NOT INTRACTABLE: ICD-10-CM

## 2021-02-08 RX ORDER — IBUPROFEN 800 MG/1
800 TABLET ORAL EVERY 6 HOURS PRN
Qty: 30 TABLET | Refills: 0 | Status: SHIPPED | OUTPATIENT
Start: 2021-02-08 | End: 2021-04-21 | Stop reason: SDUPTHER

## 2021-02-08 NOTE — TELEPHONE ENCOUNTER
Medication:   Requested Prescriptions     Pending Prescriptions Disp Refills    ibuprofen (ADVIL;MOTRIN) 800 MG tablet 30 tablet 0     Sig: Take 1 tablet by mouth every 6 hours as needed for Pain        Last Filled:  7/6/2020 #30 Refills 0    Patient Phone Number: [phone] (home)     Last appt: 7/6/2020   Next appt: Visit date not found    Last OARRS: No flowsheet data found.

## 2021-02-08 NOTE — TELEPHONE ENCOUNTER
From: Donna Amaral  To: Jaime Hwang MD  Sent: 2/8/2021 1:14 PM EST  Subject: Prescription Question    Good afternoon,     I would like to get a refill on the ibuprofen 800 MG tablets . Is this possible?

## 2021-02-12 ENCOUNTER — PATIENT MESSAGE (OUTPATIENT)
Dept: FAMILY MEDICINE CLINIC | Age: 36
End: 2021-02-12

## 2021-02-12 ENCOUNTER — OFFICE VISIT (OUTPATIENT)
Dept: FAMILY MEDICINE CLINIC | Age: 36
End: 2021-02-12
Payer: COMMERCIAL

## 2021-02-12 VITALS
DIASTOLIC BLOOD PRESSURE: 102 MMHG | OXYGEN SATURATION: 98 % | WEIGHT: 142 LBS | HEIGHT: 64 IN | SYSTOLIC BLOOD PRESSURE: 142 MMHG | HEART RATE: 65 BPM | BODY MASS INDEX: 24.24 KG/M2

## 2021-02-12 DIAGNOSIS — R07.9 CHEST PAIN, UNSPECIFIED TYPE: Primary | ICD-10-CM

## 2021-02-12 DIAGNOSIS — R03.0 ELEVATED BP WITHOUT DIAGNOSIS OF HYPERTENSION: ICD-10-CM

## 2021-02-12 PROCEDURE — 1036F TOBACCO NON-USER: CPT | Performed by: FAMILY MEDICINE

## 2021-02-12 PROCEDURE — G8484 FLU IMMUNIZE NO ADMIN: HCPCS | Performed by: FAMILY MEDICINE

## 2021-02-12 PROCEDURE — G8427 DOCREV CUR MEDS BY ELIG CLIN: HCPCS | Performed by: FAMILY MEDICINE

## 2021-02-12 PROCEDURE — 99213 OFFICE O/P EST LOW 20 MIN: CPT | Performed by: FAMILY MEDICINE

## 2021-02-12 PROCEDURE — G8420 CALC BMI NORM PARAMETERS: HCPCS | Performed by: FAMILY MEDICINE

## 2021-02-12 PROCEDURE — 93000 ELECTROCARDIOGRAM COMPLETE: CPT | Performed by: FAMILY MEDICINE

## 2021-02-12 ASSESSMENT — PATIENT HEALTH QUESTIONNAIRE - PHQ9
SUM OF ALL RESPONSES TO PHQ9 QUESTIONS 1 & 2: 0
SUM OF ALL RESPONSES TO PHQ QUESTIONS 1-9: 0

## 2021-02-12 NOTE — PROGRESS NOTES
Chief Complaint: Chest Pain (could be anxiety or stress, across center of chest, and feels across back and arms as well )       HPI:  Al Nieto is a 28 y.o. female here with c/o chest pain which lasted few seconds went across her chest to her neck and felt warm  She was not doing any activities during that episode    Her blood pressure has been elevated. She does check at home's and it fluctuates. Recently she has not checked  She has history of migraine but in the recent past her headaches have been gotten worse. Is not full-blown migraine but she does have headaches. ROS:  Constitutional: Negative   Respiratory: Negative for cough, chest tightness, shortness of breath and wheezing. Cardiovascular: as mentioned   Gastrointestinal: Negative for abdominal pain, blood in stool, constipation, diarrhea, nausea and vomiting. Genitourinary: Negative    Patient's problem list, medications, allergies, past medical, surgical, social and family histories were reviewed and updated as appropriate. Current Outpatient Medications   Medication Sig Dispense Refill    ibuprofen (ADVIL;MOTRIN) 800 MG tablet Take 1 tablet by mouth every 6 hours as needed for Pain 30 tablet 0    ondansetron (ZOFRAN) 4 MG tablet TAKE 1 TABLET BY MOUTH DAILY AS NEEDED FOR NAUSEA OR VOMITING 30 tablet 0    levonorgestrel (MIRENA) IUD 52 mg       butalbital-acetaminophen-caffeine (FIORICET, ESGIC) -40 MG per tablet Take 1 tablet by mouth every 6 hours as needed for Migraine 20 tablet 3    rizatriptan (MAXALT) 10 MG tablet Take 1 tablet by mouth once as needed for Migraine May repeat in 2 hours if needed 30 tablet 3     No current facility-administered medications for this visit.         Social History     Tobacco Use    Smoking status: Never Smoker    Smokeless tobacco: Never Used   Substance Use Topics    Alcohol use: No     Alcohol/week: 0.0 standard drinks        Objective:     Vitals:    02/12/21 1142   BP: (!) 142/102   Site: Right Upper Arm   Position: Sitting   Cuff Size: Medium Adult   Pulse: 65   SpO2: 98%   Weight: 142 lb (64.4 kg)   Height: 5' 4\" (1.626 m)     Body mass index is 24.37 kg/m². Wt Readings from Last 3 Encounters:   02/12/21 142 lb (64.4 kg)   01/30/20 136 lb (61.7 kg)   05/07/19 143 lb (64.9 kg)     BP Readings from Last 3 Encounters:   02/12/21 (!) 142/102   01/30/20 (!) 140/100   03/26/19 (!) 153/99       Physical exam:  Constitutional: she is oriented to person, place, and time. she appears well-developed and well-nourished. No distress. Cardiovascular: Normal rate, regular rhythm, normal heart sounds and intact distal pulses. No murmur heard. Pulmonary/Chest: Effort normal and breath sounds normal. No stridor. No respiratory distress. she has no wheezes. she has no rales. sheexhibits no tenderness. Abdominal: Soft. Bowel sounds are normal. she exhibits no distension and no mass. There is no tenderness. There is no rebound and no guarding. Assessment/Plan:   1. Chest pain, unspecified type  ekg normal  - EKG 12 Lead    2.  Elevated BP without diagnosis of hypertension  Elevated and could be the cause for her symptoms  Advised to keep the log of bp and follow up in a month         Ana Wei  2/12/2021 1:04 PM

## 2021-03-15 RX ORDER — LISINOPRIL 10 MG/1
10 TABLET ORAL DAILY
Qty: 90 TABLET | Refills: 1 | Status: SHIPPED | OUTPATIENT
Start: 2021-03-15 | End: 2021-06-28 | Stop reason: SDUPTHER

## 2021-04-20 DIAGNOSIS — G43.009 MIGRAINE WITHOUT AURA AND WITHOUT STATUS MIGRAINOSUS, NOT INTRACTABLE: ICD-10-CM

## 2021-04-21 DIAGNOSIS — G43.009 MIGRAINE WITHOUT AURA AND WITHOUT STATUS MIGRAINOSUS, NOT INTRACTABLE: ICD-10-CM

## 2021-04-21 RX ORDER — RIZATRIPTAN BENZOATE 10 MG/1
10 TABLET ORAL
Qty: 30 TABLET | Refills: 3 | Status: SHIPPED | OUTPATIENT
Start: 2021-04-21 | End: 2022-03-09

## 2021-04-21 RX ORDER — IBUPROFEN 800 MG/1
800 TABLET ORAL EVERY 6 HOURS PRN
Qty: 30 TABLET | Refills: 2 | Status: SHIPPED | OUTPATIENT
Start: 2021-04-21 | End: 2022-08-29

## 2021-04-21 RX ORDER — RIZATRIPTAN BENZOATE 10 MG/1
10 TABLET ORAL
Qty: 30 TABLET | Refills: 3 | Status: SHIPPED | OUTPATIENT
Start: 2021-04-21 | End: 2021-06-28 | Stop reason: SDUPTHER

## 2021-04-21 NOTE — TELEPHONE ENCOUNTER
Medication:   Requested Prescriptions     Pending Prescriptions Disp Refills    rizatriptan (MAXALT) 10 MG tablet 30 tablet 3     Sig: Take 1 tablet by mouth once as needed for Migraine May repeat in 2 hours if needed    ibuprofen (ADVIL;MOTRIN) 800 MG tablet 30 tablet 0     Sig: Take 1 tablet by mouth every 6 hours as needed for Pain        Last Filled:  4/3/2020 #30 Refills 3  2/8/2021 #30 Refills 0    Patient Phone Number: [phone] (home)     Last appt: 2/12/2021   Next appt: Visit date not found    Last OARRS: No flowsheet data found.

## 2021-04-21 NOTE — TELEPHONE ENCOUNTER
Please advise. Script was written for 30 tablets plus 3 refills, pharmacy is asking for 4 tablets and 29 refills. Pended for a month supply with three refills . Medication:   Requested Prescriptions     Pending Prescriptions Disp Refills    rizatriptan (MAXALT) 10 MG tablet [Pharmacy Med Name: RIZATRIPTAN 10 MG TABLET] 4 tablet 29     Sig: TAKE 1 TABLET BY MOUTH ONCE AS NEEDED FOR MIGRAINE MAY REPEAT IN 2 HOURS IF NEEDED        Last Filled:  4/3/2020 #30 Refills 3    Patient Phone Number: [phone] (home)     Last appt: 2/12/2021   Next appt: Visit date not found    Last OARRS: No flowsheet data found.

## 2021-06-26 ENCOUNTER — PATIENT MESSAGE (OUTPATIENT)
Dept: FAMILY MEDICINE CLINIC | Age: 36
End: 2021-06-26

## 2021-06-26 DIAGNOSIS — G43.009 MIGRAINE WITHOUT AURA AND WITHOUT STATUS MIGRAINOSUS, NOT INTRACTABLE: ICD-10-CM

## 2021-06-28 RX ORDER — LISINOPRIL 10 MG/1
10 TABLET ORAL DAILY
Qty: 90 TABLET | Refills: 1 | Status: SHIPPED | OUTPATIENT
Start: 2021-06-28 | End: 2022-03-07

## 2021-06-28 RX ORDER — RIZATRIPTAN BENZOATE 10 MG/1
10 TABLET ORAL
Qty: 30 TABLET | Refills: 3 | Status: SHIPPED | OUTPATIENT
Start: 2021-06-28 | End: 2022-03-09

## 2021-06-28 NOTE — TELEPHONE ENCOUNTER
From: Ana Robertson  To: Rianna Al MD  Sent: 6/26/2021 10:35 AM EDT  Subject: Prescription Question    I need to see and hope I can go ahead and get my Rizatriptan migraine medicine and blood pressure medicine Refill before the end of this month I change jobs and will not have insurance for 90 days. If I can, can you please submit both request thank you very much    Rush Ogden!

## 2021-06-28 NOTE — TELEPHONE ENCOUNTER
Pended Rx. Please advise, thanks    Medication:   Requested Prescriptions     Pending Prescriptions Disp Refills    rizatriptan (MAXALT) 10 MG tablet 30 tablet 3     Sig: Take 1 tablet by mouth once as needed for Migraine May repeat in 2 hours if needed    lisinopril (PRINIVIL;ZESTRIL) 10 MG tablet 90 tablet 1     Sig: Take 1 tablet by mouth daily        Last Filled:  4/21/2021 #30 Refills 3  3/15/2021 #90 Refills 1    Patient Phone Number: [phone] (home)     Last appt: 2/12/2021   Next appt: Visit date not found    Last OARRS: No flowsheet data found.

## 2022-03-06 DIAGNOSIS — R03.0 ELEVATED BP WITHOUT DIAGNOSIS OF HYPERTENSION: Primary | ICD-10-CM

## 2022-03-07 RX ORDER — LISINOPRIL 10 MG/1
TABLET ORAL
Qty: 90 TABLET | Refills: 3 | Status: SHIPPED | OUTPATIENT
Start: 2022-03-07 | End: 2022-10-13

## 2022-03-07 NOTE — TELEPHONE ENCOUNTER
Medication:   Requested Prescriptions     Pending Prescriptions Disp Refills    lisinopril (PRINIVIL;ZESTRIL) 10 MG tablet [Pharmacy Med Name: LISINOPRIL 10 MG TABLET] 90 tablet 3     Sig: TAKE 1 TABLET BY MOUTH EVERY DAY        Last Filled:  6/28/2021 #90 Refills 1    Patient Phone Number: [phone] (home)     Last appt: 2/12/2021   Next appt: Visit date not found    Last OARRS: No flowsheet data found.

## 2022-03-09 ENCOUNTER — TELEPHONE (OUTPATIENT)
Dept: FAMILY MEDICINE CLINIC | Age: 37
End: 2022-03-09

## 2022-03-09 NOTE — TELEPHONE ENCOUNTER
----- Message from Dionicio Broussard sent at 3/9/2022  4:08 PM EST -----  Subject: Message to Provider    QUESTIONS  Information for Provider? Patient wants to know where should she go to get   bloodwork.  ---------------------------------------------------------------------------  --------------  CALL BACK INFO  What is the best way for the office to contact you? OK to respond with   electronic message via [employer] portal (only for patients who have   registered [employer] account)  Preferred Call Back Phone Number? [phone]  ---------------------------------------------------------------------------  --------------  SCRIPT ANSWERS  Relationship to Patient?  Self

## 2022-03-11 DIAGNOSIS — I10 PRIMARY HYPERTENSION: ICD-10-CM

## 2022-03-11 LAB
ANION GAP SERPL CALCULATED.3IONS-SCNC: 14 MMOL/L (ref 3–16)
BASOPHILS ABSOLUTE: 0.1 K/UL (ref 0–0.2)
BASOPHILS RELATIVE PERCENT: 0.8 %
BUN BLDV-MCNC: 17 MG/DL (ref 7–20)
CALCIUM SERPL-MCNC: 10.6 MG/DL (ref 8.3–10.6)
CHLORIDE BLD-SCNC: 100 MMOL/L (ref 99–110)
CHOLESTEROL, TOTAL: 228 MG/DL (ref 0–199)
CO2: 25 MMOL/L (ref 21–32)
CREAT SERPL-MCNC: 0.8 MG/DL (ref 0.6–1.1)
EOSINOPHILS ABSOLUTE: 0.1 K/UL (ref 0–0.6)
EOSINOPHILS RELATIVE PERCENT: 0.5 %
GFR AFRICAN AMERICAN: >60
GFR NON-AFRICAN AMERICAN: >60
GLUCOSE BLD-MCNC: 88 MG/DL (ref 70–99)
HCT VFR BLD CALC: 41.5 % (ref 36–48)
HDLC SERPL-MCNC: 49 MG/DL (ref 40–60)
HEMOGLOBIN: 13.9 G/DL (ref 12–16)
LDL CHOLESTEROL CALCULATED: 163 MG/DL
LYMPHOCYTES ABSOLUTE: 1.7 K/UL (ref 1–5.1)
LYMPHOCYTES RELATIVE PERCENT: 14.6 %
MCH RBC QN AUTO: 29.7 PG (ref 26–34)
MCHC RBC AUTO-ENTMCNC: 33.5 G/DL (ref 31–36)
MCV RBC AUTO: 88.5 FL (ref 80–100)
MONOCYTES ABSOLUTE: 1.2 K/UL (ref 0–1.3)
MONOCYTES RELATIVE PERCENT: 10.1 %
NEUTROPHILS ABSOLUTE: 8.4 K/UL (ref 1.7–7.7)
NEUTROPHILS RELATIVE PERCENT: 74 %
PDW BLD-RTO: 13.9 % (ref 12.4–15.4)
PLATELET # BLD: 304 K/UL (ref 135–450)
PMV BLD AUTO: 9.2 FL (ref 5–10.5)
POTASSIUM SERPL-SCNC: 4.2 MMOL/L (ref 3.5–5.1)
RBC # BLD: 4.69 M/UL (ref 4–5.2)
SODIUM BLD-SCNC: 139 MMOL/L (ref 136–145)
TRIGL SERPL-MCNC: 79 MG/DL (ref 0–150)
VLDLC SERPL CALC-MCNC: 16 MG/DL
WBC # BLD: 11.4 K/UL (ref 4–11)

## 2022-03-22 ENCOUNTER — OFFICE VISIT (OUTPATIENT)
Dept: FAMILY MEDICINE CLINIC | Age: 37
End: 2022-03-22
Payer: COMMERCIAL

## 2022-03-22 ENCOUNTER — PATIENT MESSAGE (OUTPATIENT)
Dept: FAMILY MEDICINE CLINIC | Age: 37
End: 2022-03-22

## 2022-03-22 VITALS
WEIGHT: 147 LBS | DIASTOLIC BLOOD PRESSURE: 88 MMHG | HEIGHT: 64 IN | BODY MASS INDEX: 25.1 KG/M2 | HEART RATE: 66 BPM | OXYGEN SATURATION: 99 % | SYSTOLIC BLOOD PRESSURE: 138 MMHG

## 2022-03-22 DIAGNOSIS — I10 PRIMARY HYPERTENSION: ICD-10-CM

## 2022-03-22 DIAGNOSIS — Z00.00 ANNUAL PHYSICAL EXAM: Primary | ICD-10-CM

## 2022-03-22 PROBLEM — M84.375A METATARSAL STRESS FRACTURE OF LEFT FOOT: Status: RESOLVED | Noted: 2019-05-08 | Resolved: 2022-03-22

## 2022-03-22 PROBLEM — O13.3 GESTATIONAL HTN, THIRD TRIMESTER: Status: RESOLVED | Noted: 2018-01-04 | Resolved: 2022-03-22

## 2022-03-22 PROCEDURE — 99395 PREV VISIT EST AGE 18-39: CPT | Performed by: FAMILY MEDICINE

## 2022-03-22 NOTE — PROGRESS NOTES
Chief Complaint: Annual Exam       HPI: She is here for annual physical exam and follow-up of hypertension    Her blood pressure is well controlled with lisinopril 10 mg daily  Her blood work has been stable except for lipid profile which is gotten worse. Otherwise denies any concern      Patient's problem list, medications, allergies, past medical, surgical, social and family histories were reviewed and updated as appropriate. Current Outpatient Medications   Medication Sig Dispense Refill    lisinopril (PRINIVIL;ZESTRIL) 10 MG tablet TAKE 1 TABLET BY MOUTH EVERY DAY 90 tablet 3    ibuprofen (ADVIL;MOTRIN) 800 MG tablet Take 1 tablet by mouth every 6 hours as needed for Pain 30 tablet 2    levonorgestrel (MIRENA) IUD 52 mg       butalbital-acetaminophen-caffeine (FIORICET, ESGIC) -40 MG per tablet Take 1 tablet by mouth every 6 hours as needed for Migraine 20 tablet 3     No current facility-administered medications for this visit. Social History     Tobacco Use    Smoking status: Never Smoker    Smokeless tobacco: Never Used   Substance Use Topics    Alcohol use: No     Alcohol/week: 0.0 standard drinks            Review of Systems:  Constitutional: Negative for appetite change, fatigue, fever and unexpected weight change. HENT: Negative   Respiratory: Negative for cough, chest tightness, shortness of breath and wheezing. Cardiovascular: Negative for chest pain, palpitations and leg swelling. Gastrointestinal: Negative for abdominal pain, blood in stool, constipation, diarrhea, nausea and vomiting. Genitourinary: Negative for difficulty urinating, flank pain, frequency, hematuria and urgency. Musculoskeletal: Negative for gait problem, joint swelling and myalgias. Skin: Negative for color change, pallor, rash and wound. Neurological: Negative for dizziness, tremors, seizures, syncope, facial asymmetry, speech difficulty, weakness, light-headedness, numbness and headaches. Abel Whitaker Psychiatric/Behavioral: Negative for agitation, confusion, self-injury, sleep disturbance and suicidal ideas. The patient is not nervous/anxious. Objective:     Vitals:    03/22/22 1639   BP: 138/88   Pulse: 66   SpO2: 99%   Weight: 147 lb (66.7 kg)   Height: 5' 4\" (1.626 m)     Body mass index is 25.23 kg/m². Wt Readings from Last 3 Encounters:   03/22/22 147 lb (66.7 kg)   02/12/21 142 lb (64.4 kg)   01/30/20 136 lb (61.7 kg)     BP Readings from Last 3 Encounters:   03/22/22 138/88   02/12/21 (!) 142/102   01/30/20 (!) 140/100       Physical exam:  Constitutional: she is oriented to person, place, and time. she appears well-developed and well-nourished. No distress. HENT:   Head: Normocephalic. Right Ear: External ear normal. Normal TM   Left Ear: External ear normal. Normal TM  Nose: Nose normal.   Mouth/Throat: Oropharynx is clear and moist. No oropharyngeal exudate. Eyes: Conjunctivae and EOM are normal. Pupils are equal, round, and reactive to light. Right eye exhibits no discharge. Left eye exhibits no discharge. No scleral icterus. Neck: Normal range of motion. No JVD present. No tracheal deviation present. No thyromegaly present. Cardiovascular: Normal rate, regular rhythm, normal heart sounds and intact distal pulses. No murmur heard. Pulmonary/Chest: Effort normal and breath sounds normal. No stridor. No respiratory distress. she has no wheezes. she has no rales. sheexhibits no tenderness. Abdominal: Soft. Bowel sounds are normal. she exhibits no distension and no mass. There is no tenderness. There is no rebound and no guarding. Musculoskeletal: Normal range of motion. she exhibits no edema, tenderness or deformity. Lymphadenopathy:     she has no cervical adenopathy. Neurological:she is alert and oriented to person, place, and time. she  has normal reflexes. No cranial nerve deficit. Coordination normal.   Skin: Skin is warm and dry. No rash noted. she is not diaphoretic. No erythema. No pallor. Psychiatric: she has a normal mood and affect. her   behavior is normal.         Health Maintenance   Topic Date Due    Varicella vaccine (1 of 2 - 2-dose childhood series) Never done    Flu vaccine (1) 09/01/2021    COVID-19 Vaccine (2 - Booster for Rei series) 03/11/2022    Depression Screen  03/09/2023    Potassium monitoring  03/11/2023    Creatinine monitoring  03/11/2023    DTaP/Tdap/Td vaccine (3 - Td or Tdap) 05/08/2024    Cervical cancer screen  01/19/2026    Hepatitis C screen  Completed    HIV screen  Completed    Hepatitis A vaccine  Aged Out    Hepatitis B vaccine  Aged Out    Hib vaccine  Aged Out    Meningococcal (ACWY) vaccine  Aged Out    Pneumococcal 0-64 years Vaccine  Aged Out          Assessment/Plan:     1. Annual physical exam  Up-to-date with immunization  Advised on healthy lifestyles for hyperlipidemia  If it does not improve in a year will start the medication    2. Primary hypertension  Stable continue the lisinopril 10 mg daily       No follow-ups on file.     Rao Sandhu MD  3/22/2022 6:46 PM

## 2022-03-24 ENCOUNTER — PATIENT MESSAGE (OUTPATIENT)
Dept: FAMILY MEDICINE CLINIC | Age: 37
End: 2022-03-24

## 2022-03-24 DIAGNOSIS — M25.562 ACUTE PAIN OF BOTH KNEES: Primary | ICD-10-CM

## 2022-03-24 DIAGNOSIS — M25.561 ACUTE PAIN OF BOTH KNEES: Primary | ICD-10-CM

## 2022-03-25 DIAGNOSIS — M25.569 KNEE PAIN, UNSPECIFIED CHRONICITY, UNSPECIFIED LATERALITY: Primary | ICD-10-CM

## 2022-03-30 ENCOUNTER — TELEPHONE (OUTPATIENT)
Dept: FAMILY MEDICINE CLINIC | Age: 37
End: 2022-03-30

## 2022-03-30 NOTE — TELEPHONE ENCOUNTER
----- Message from Poncho Dominguez sent at 3/30/2022 10:33 AM EDT -----  Subject: Referral Request    QUESTIONS   Reason for referral request? patient is calling in to she if pcp will put   in the x ray for her leg and one for her shoulder she stated she didn't   receive next steps if can please advise   Has the physician seen you for this condition before? No   Preferred Specialist (if applicable)? Do you already have an appointment scheduled? No  Additional Information for Provider?   ---------------------------------------------------------------------------  --------------  CALL BACK INFO  What is the best way for the office to contact you? OK to leave message on   voicemail, OK to respond with electronic message via [employer] portal (only   for patients who have registered [employer] account)  Preferred Call Back Phone Number?  [phone]

## 2022-03-30 NOTE — TELEPHONE ENCOUNTER
Referral information sent via mychart and spoke to patient. No further information is needed.  To be evaluated by ortho first

## 2022-04-05 ENCOUNTER — OFFICE VISIT (OUTPATIENT)
Dept: ORTHOPEDIC SURGERY | Age: 37
End: 2022-04-05
Payer: COMMERCIAL

## 2022-04-05 VITALS — HEIGHT: 64 IN | WEIGHT: 147 LBS | BODY MASS INDEX: 25.1 KG/M2

## 2022-04-05 DIAGNOSIS — M25.512 LEFT SHOULDER PAIN, UNSPECIFIED CHRONICITY: Primary | ICD-10-CM

## 2022-04-05 DIAGNOSIS — M25.561 RIGHT KNEE PAIN, UNSPECIFIED CHRONICITY: ICD-10-CM

## 2022-04-05 PROCEDURE — 99204 OFFICE O/P NEW MOD 45 MIN: CPT | Performed by: ORTHOPAEDIC SURGERY

## 2022-04-05 NOTE — PROGRESS NOTES
file   Tobacco Use    Smoking status: Never Smoker    Smokeless tobacco: Never Used   Substance and Sexual Activity    Alcohol use: No     Alcohol/week: 0.0 standard drinks    Drug use: No    Sexual activity: Yes     Partners: Male     Comment: Not on birth control    Other Topics Concern    Not on file   Social History Narrative    Not on file     Social Determinants of Health     Financial Resource Strain: Low Risk     Difficulty of Paying Living Expenses: Not hard at all   Food Insecurity: No Food Insecurity    Worried About [address] in the Last Year: Never true    [facility] in the Last Year: Never true   Transportation Needs:     Lack of Transportation (Medical): Not on file    Lack of Transportation (Non-Medical):  Not on file   Physical Activity:     Days of Exercise per Week: Not on file    Minutes of Exercise per Session: Not on file   Stress:     Feeling of Stress : Not on file   Social Connections:     Frequency of Communication with Friends and Family: Not on file    Frequency of Social Gatherings with Friends and Family: Not on file    Attends Yazidi Services: Not on file    Active Member of [address] [place] [address] or Organizations: Not on file    Attends Club or Organization Meetings: Not on file    Marital Status: Not on file   Intimate Partner Violence:     Fear of Current or Ex-Partner: Not on file    Emotionally Abused: Not on file    Physically Abused: Not on file    Sexually Abused: Not on file   Housing Stability:     Unable to Pay for Housing in the Last Year: Not on file    Number of [place] in the Last Year: Not on file    Unstable Housing in the Last Year: Not on file      Current Outpatient Medications on File Prior to Visit   Medication Sig Dispense Refill    lisinopril (PRINIVIL;ZESTRIL) 10 MG tablet TAKE 1 TABLET BY MOUTH EVERY DAY 90 tablet 3    ibuprofen (ADVIL;MOTRIN) 800 MG tablet Take 1 tablet by mouth every 6 hours as needed for Pain 30 tablet 2  butalbital-acetaminophen-caffeine (FIORICET, ESGIC) -40 MG per tablet Take 1 tablet by mouth every 6 hours as needed for Migraine 20 tablet 3    levonorgestrel (MIRENA) IUD 52 mg        No current facility-administered medications on file prior to visit. No Known Allergies     Review of Systems:  Constitutional: Patient is adequately groomed with no evidence of malnutrition  Mental Status: The patient is oriented to time, place and person. The patient's mood and affect are appropriate. Lymphatic: The lymphatic examination bilaterally reveals all areas to be without enlargement or induration. Vascular: Examination reveals no swelling or calf tenderness. Peripheral pulses are palpable and 2+. Neurological: The patient has good coordination. There is no weakness or sensory deficit. Skin:  Head/Neck: inspection reveals no rashes, ulcerations or lesions. Trunk: inspection reveals no rashes, ulcerations or lesions.     Objective:  Ht 5' 4\" (1.626 m)   Wt 147 lb (66.7 kg)   BMI 25.23 kg/m²      Physical Exam:  The patient is well-appearing and in no apparent distress  Neg Spurling's test  Examination of the left shoulder  There is no swelling, ecchymosis, or gross deformity  There is no evidence of muscle atrophy  + Patel test, neg Neers test  neg bicipital groove tenderness, neg AC joint tenderness  Range of motion reveals 150 degrees of forward flexion, 150 degrees of abduction, 60 degrees of external rotation, internal rotation to thoracic spine  5/5 strength with resisted abduction, 5/5 strength with resisted external rotation, 5/5 strength with resisted internal rotation  Intact motor and sensory function throughout the median/radial/ulnar/PIN/AIN distributions  Palpable radial pulse, brisk cap refill, 2+ symmetric reflexes    Examination of the lower extremity  No pain with full range of motion of hip and knee  No swelling throughout the lower extremity  5 out of 5 strength throughout distal muscle groups  Sensation is intact to light touch throughout all distributions  There is no calf swelling or tenderness  Palpable DP pulse, brisk cap refill, 2+ symmetric reflexes    Imagin view x-rays of the right knee obtained in the office today on 2022 were reviewed. There is no fracture or dislocation. No other abnormality. Comparison x-rays left knee demonstrates no abnormality. 4 view x-rays of the left shoulder obtained the office today on 2022 were reviewed. There is no fracture or dislocation. No other abnormality. Assessment:  1. Right lower extremity pain -unclear etiology. Possible lumbar radiculopathy  2. Left shoulder pain - suspect impingement with possible strain/overuse      Plan:  I had a very long discussion with the patient. It is very unclear the exact etiology to her right lower extremity pain. She has a completely normal exam as well as imaging. However it is possible she is experiencing lumbar radiculopathy given the radiation of pain. In regards to her shoulder, I would characterize this is likely overuse or strain as result of  her child. For both of these I would recommend physical therapy one-time evaluation to instruct on home regiment. I also offered a Medrol Dosepak but she would like to hold off. The patient return as needed. If her symptoms persist or worsen she will give us a call the next step would be a left shoulder MRI or for the lower extremity likely a lumbar spine MRI to rule out nerve root impingement. Greater than 45 minutes were spent with this encounter. Time spent included evaluating the patient's chart prior to arrival.  Evaluating the patient in the office including history, physical examination, imaging reviewing, and counseling on next steps. Lastly, time was spent discussing orders with my staff as well as providing documentation in the chart.                    Katy Mercado MD            Orthopaedic Surgery Sports Medicine and [address] [facility] [place] Abernathy and [facility]            Team Physician [place] ([id number])      Disclaimer: This note was dictated with voice recognition software. Though review and correction are routine, we apologize for any errors.

## 2022-04-06 ENCOUNTER — HOSPITAL ENCOUNTER (OUTPATIENT)
Dept: PHYSICAL THERAPY | Age: 37
Setting detail: THERAPIES SERIES
Discharge: HOME | End: 2022-04-06
Payer: COMMERCIAL

## 2022-04-06 PROCEDURE — 97162 PT EVAL MOD COMPLEX 30 MIN: CPT

## 2022-04-06 NOTE — PLAN OF CARE
[place] [address]  Phone [phone]   Fax [phone]                                                        Physical Therapy Certification    Dear Referring Practitioner: Dr. Chalo Santillan,    We had the pleasure of evaluating the following patient for physical therapy services at [address] [place] [address]. A summary of our findings can be found in the initial assessment below. This includes our plan of care. If you have any questions or concerns regarding these findings, please do not hesitate to contact me at the office phone number checked above. Thank you for the referral.       Physician Signature:_______________________________Date:__________________  By signing above (or electronic signature), therapists plan is approved by physician      Patient: Anthony Watts   : 1985   MRN: [id number]  Referring Physician: Referring Practitioner: Dr. Chalo Santillan      Evaluation Date: 2022      Medical Diagnosis Information: M25.561, M25 (ICD=10-CM) - Acute pain of both knees   Diagnosis: M25. 561.  Pain of both knees   Treatment Diagnosis: Decreased LE strength, R LE pain                                         Insurance information: PT Insurance Information: BC/BS Ductible: [phone] visitsBC/BS      Precautions/ Contra-indications: NA     C-SSRS Triggered by Intake questionnaire (Past 2 wk assessment):   [x] No, Questionnaire did not trigger screening.   [] Yes, Patient intake triggered further evaluation      [] C-SSRS Screening completed  [] PCP notified via Plan of Care  [] Emergency services notified     Latex Allergy:  [x]NO      []YES  Preferred Language for Healthcare:   [x]English       []other:    SUBJECTIVE: Patient stated complaint: Pain started about a year ago, sharp pain started in the R thigh that radiates into the knee and at times in to the foot. Dr. Paola Yang referred here, follow up for possible MRI. States that it most frequently bothers her while running at the gym. Typical distance is approx. 2 miles. Also reports that pain lasts approximately 15 minutes. It is relieved by rest. Pt reports that it although the pain has been intermittent over the last year it has been more frequent over the last three weeks. Relevant Medical History:HTN   Functional Disability Index: FOTO LE = 63    Pain Scale: 8/10 at its worst    Easing factors: rest  Provocative factors: running, walking      Type: []Constant   [x]Intermittent  [x]Radiating []Localized []other:     Numbness/Tingling: Denies     Occupation/School: desk job      Living Status/Prior Level of Function: Independent with ADLs and IADLs    OBJECTIVE:     ROM LEFT RIGHT   LE  WNL  WNL    Strength  LEFT RIGHT   HIP Flexors     HIP Abductors 4/5 3/5   HIP Ext     Hip ER     Knee EXT (quad) 4/5 3+/5   Knee Flex (HS)          Circumference  Mid apex  7 cm prox               Balance: Moderate sway with single leg stance on R       Reflexes/Sensation:    [x]Dermatomes/Myotomes intact    [x]Reflexes equal and normal bilaterally   []Other:    Joint mobility:    [x]Normal    []Hypo   []Hyper    Palpation: Negative for pain or to elicit symptoms on quad tendon     Functional Mobility/Transfers: Treadmill running limited by shooting pain in RLE     Posture: B pronation for squats, B genu valgum for squats     Bandages/Dressings/Incisions: NA    Gait: (include devices/WB status): [facility]     Orthopedic Special Tests: R knee valgus/varus stress test - negative, R femoral nerve test - negative                        [x] Patient history, allergies, meds reviewed. Medical chart reviewed. See intake form. Review Of Systems (ROS):  [x]Performed Review of systems (Integumentary, CardioPulmonary, Neurological) by intake and observation. Intake form has been scanned into medical record. Patient has been instructed to contact their primary care physician regarding ROS issues if not already being addressed at this time. Co-morbidities/Complexities (which will affect course of rehabilitation):   []None           Arthritic conditions   []Rheumatoid arthritis (M05.9)  []Osteoarthritis (M19.91)   Cardiovascular conditions   [x]Hypertension (I10)  []Hyperlipidemia (E78.5)  []Angina pectoris (I20)  []Atherosclerosis (I70)   Musculoskeletal conditions   []Disc pathology   []Congenital spine pathologies   []Prior surgical intervention  []Osteoporosis (M81.8)  []Osteopenia (M85.8)   Endocrine conditions   []Hypothyroid (E03.9)  []Hyperthyroid Gastrointestinal conditions   []Constipation (F31.13)   Metabolic conditions   []Morbid obesity (E66.01)  []Diabetes type 1(E10.65) or 2 (E11.65)   []Neuropathy (G60.9)     Pulmonary conditions   []Asthma (J45)  []Coughing   []COPD (J44.9)   Psychological Disorders  []Anxiety (F41.9)  []Depression (F32.9)   []Other:   []Other:          Barriers to/and or personal factors that will affect rehab potential:              []Age  []Sex              []Motivation/Lack of Motivation                        []Co-Morbidities              []Cognitive Function, education/learning barriers              []Environmental, home barriers              []profession/work barriers  []past PT/medical experience  [x]other:  Justification:  Unpredictability of symptoms/unable to reproduce symptoms during evaluation and treatment    Falls Risk Assessment (30 days):   [x] Falls Risk assessed and no intervention required. [] Falls Risk assessed and Patient requires intervention due to being higher risk   TUG score (>12s at risk):     [] Falls education provided, including         ASSESSMENT: Pt's RLE radiating pain was not able to be reproduced today. Pt presents with decreased RLE  strength, more pronounced in glutes. Pt also presents with deficits in balance with SLS more evident on R.  Pt educated and provided with HEP to address deficits in strength with follow-up scheduled. Functional Impairments:     []Noted lumbar/proximal hip/LE joint hypomobility   [x]Decreased LE functional ROM   [x]Decreased core/proximal hip strength and neuromuscular control   [x]Decreased LE functional strength   [x]Reduced balance/proprioceptive control   []other:      Functional Activity Limitations (from functional questionnaire and intake)   []Reduced ability to tolerate prolonged functional positions   []Reduced ability or difficulty with changes of positions or transfers between positions   [x]Reduced ability to maintain good posture and demonstrate good body mechanics with sitting, bending, and lifting   []Reduced ability to sleep   [] Reduced ability or tolerance with driving and/or computer work   []Reduced ability to perform lifting, carrying tasks   [x]Reduced ability to squat   [x]Reduced ability to forward bend   [x]Reduced ability to ambulate prolonged functional periods/distances/surfaces   []Reduced ability to ascend/descend stairs   [x]Reduced ability to run, hop, cut or jump   []other:    Participation Restrictions   []Reduced participation in self care activities   []Reduced participation in home management activities   []Reduced participation in work activities   [x]Reduced participation in social activities. [x]Reduced participation in sport/recreation activities. Classification :    []Signs/symptoms consistent with post-surgical status including decreased ROM, strength and function.    []Signs/symptoms consistent with joint sprain/strain   []Signs/symptoms consistent with patella-femoral syndrome   []Signs/symptoms consistent with knee OA/hip OA   []Signs/symptoms consistent with internal derangement of knee/Hip   [x]Signs/symptoms consistent with functional hip weakness/NMR control      []Signs/symptoms consistent with tendinitis/tendinosis    []signs/symptoms consistent with pathology which may benefit from Dry needling      []other:      Prognosis/Rehab Potential:      [x]Excellent   []Good    []Fair   []Poor    Tolerance of evaluation/treatment:    [x]Excellent   []Good    []Fair   []Poor    Physical Therapy Evaluation Complexity Justification  [] A history of present problem with:  [x] no personal factors and/or comorbidities that impact the plan of care;  []1-2 personal factors and/or comorbidities that impact the plan of care  []3 personal factors and/or comorbidities that impact the plan of care  [] An examination of body systems using standardized tests and measures addressing any of the following: body structures and functions (impairments), activity limitations, and/or participation restrictions;:  [x] a total of 1-2 or more elements   [] a total of 3 or more elements   [] a total of 4 or more elements   [] A clinical presentation with:  [] stable and/or uncomplicated characteristics   [x] evolving clinical presentation with changing characteristics  [] unstable and unpredictable characteristics;   [] Clinical decision making of [] low, [x] moderate, [] high complexity using standardized patient assessment instrument and/or measurable assessment of functional outcome. [] EVAL (LOW) 74475 (typically 30 minutes face-to-face)  [x] EVAL (MOD) 21315 (typically 30 minutes face-to-face)  [] EVAL (HIGH) 87948 (typically 45 minutes face-to-face)  [] RE-EVAL     PLAN:   Frequency/Duration:  1 days per week for 6-8 Weeks:  Interventions:  [x]  Therapeutic exercise including: strength training, ROM, for Lower extremity and core   [x]  NMR activation and proprioception for LE, Glutes and Core   [x]  Manual therapy as indicated for LE, Hip and spine to include: Dry Needling/IASTM, STM, PROM, Gr I-IV mobilizations, manipulation.    [x] Modalities as needed that may include: thermal agents, E-stim, Biofeedback, US, iontophoresis as indicated  [x] Patient education on joint protection, postural re-education, activity modification, progression of HEP. HEP instruction: (see scanned forms)    GOALS:  Patient stated goal: To prevent pain from reappearing in R leg   [] Progressing: [] Met: [x] Not Met: [] Adjusted    Therapist goals for Patient:   Short Term Goals: To be achieved in: 2 weeks  1. Independent in HEP and progression per patient tolerance, in order to prevent re-injury. [] Progressing: [] Met: [x] Not Met: [] Adjusted  2. Patient will have a decrease in pain to facilitate improvement in movement, function, and ADLs as indicated by Functional Deficits. [] Progressing: [] Met: [x] Not Met: [] Adjusted    Long Term Goals: To be achieved in: 8 weeks  1. Disability index score of FOTO to 79 to assist with reaching prior level of function. [] Progressing: [] Met: [x] Not Met: [] Adjusted  2. Patient will demonstrate an increase in Strength to good proximal hip strength and control, within 5/5 in LE to allow for proper functional mobility as indicated by patients Functional Deficits. [] Progressing: [] Met: [x] Not Met: [] Adjusted  3.. Patient will return to running on the treadmill without increased symptoms or restriction. [] Progressing: [] Met: [x] Not Met: [] Adjusted  4. Patient will be able to lift her daughter without pain     [] Progressing: [] Met: [x] Not Met: [] Adjusted     Electronically signed by:   Chano Velez, PT

## 2022-04-06 NOTE — FLOWSHEET NOTE
[facility] [employer]    Physical Therapy Treatment Note/ Progress Report:     Date:  2022    Patient Name:  Juan Diego Holden    :  1985  MRN: [id number]  Medical/Treatment Diagnosis Information:  Diagnosis: M25. 561.  Pain of both knees  M25.561, M25 (ICD=10-CM) - Acute pain of both knees   Treatment Diagnosis: Decreased LE strength, R LE pain  Insurance/Certification information:  PT Insurance Information: BC/BS Ductible: [phone] visitsBC/BS   Physician Information:  Referring Practitioner: Dr. Perry Kuo MD   Plan of care signed (Y/N):      Date of Patient follow up with Physician:      Progress Report: []  Yes  []  No      Functional Scale:  FOTO LE 63    Date: 22    Date Range for reporting period:  Beginnin22  Ending:     Progress report due (10 Rx/or 30 days whichever is less):     Recertification due (POC duration/ or 90 days whichever is less): 22    Visit # Insurance Allowable Auth Needed   1 60 []Yes    [x]No     Pain level:  8/10     SUBJECTIVE:  See eval     OBJECTIVE: See eval   Observation:    Test measurements:      RESTRICTIONS/PRECAUTIONS: NA    Exercises/Interventions:     Therapeutic Ex Resistance Sets/sec Reps Notes   SLR Supine   2 10    S/L Clamshell  Green 2 10    LAQ 1# 2 10    Prone Quad Stretch   4 30 sec     Bridge   2 10                                                        Therapeutic Activities                                                               Manual Intervention       Knee mobs/PROM       Tib/Fem Mobs       Patella Mobs       Ankle mobs                     NMR re-education                                                                          Therapeutic Exercise and NMR EXR  [x] (03631) Provided verbal/tactile cueing for activities related to strengthening, flexibility, endurance, ROM for improvements in LE, proximal hip, and core control with self care, mobility, lifting, ambulation.  [] (83426) Provided verbal/tactile cueing for activities related to improving balance, coordination, kinesthetic sense, posture, motor skill, proprioception  to assist with LE, proximal hip, and core control in self care, mobility, lifting, ambulation and eccentric single leg control. NMR and Therapeutic Activities:    [] (07141 or 93573) Provided verbal/tactile cueing for activities related to improving balance, coordination, kinesthetic sense, posture, motor skill, proprioception and motor activation to allow for proper function of core, proximal hip and LE with self care and ADLs  [] (36999) Gait Re-education- Provided training and instruction to the patient for proper LE, core and proximal hip recruitment and positioning and eccentric body weight control with ambulation re-education including up and down stairs      Home Exercise Program:    [x] (41160) Reviewed/Progressed HEP activities related to strengthening, flexibility, endurance, ROM of core, proximal hip and LE for functional self-care, mobility, lifting and ambulation/stair navigation   [] (20862)Reviewed/Progressed HEP activities related to improving balance, coordination, kinesthetic sense, posture, motor skill, proprioception of core, proximal hip and LE for self care, mobility, lifting, and ambulation/stair navigation      Manual Treatments:  PROM / STM / Oscillations-Mobs:  G-I, II, III, IV (PA's, Inf., Post.)  [] (91866) Provided manual therapy to mobilize LE, proximal hip and/or LS spine soft tissue/joints for the purpose of modulating pain, promoting relaxation,  increasing ROM, reducing/eliminating soft tissue swelling/inflammation/restriction, improving soft tissue extensibility and allowing for proper ROM for normal function with self care, mobility, lifting and ambulation.      Modalities:      Charges:  Timed Code Treatment Minutes: 45   Total Treatment Minutes: 45       [] EVAL (LOW) 24870 (typically 20 minutes face-to-face)  [x] EVAL (MOD) 56278 (typically 30 minutes face-to-face)  [] EVAL (HIGH) 04072 (typically 45 minutes face-to-face)  [] RE-EVAL     [] BV(58518) x     [] IONTO (95937)  [] NMR (38684) x     [] VASO (03410)  [] Manual (62881) x     [] Other:  [] TA (29900)x     [] Mech Traction (30352)  [] ES(attended) (38384)     [] ES (un) (38252):      GOALS:  Patient stated goal: To prevent pain from reappearing in R leg   []? Progressing: []? Met: [x]? Not Met: []? Adjusted     Therapist goals for Patient:   Short Term Goals: To be achieved in: 2 weeks  1. Independent in HEP and progression per patient tolerance, in order to prevent re-injury. []? Progressing: []? Met: [x]? Not Met: []? Adjusted  2. Patient will have a decrease in pain to facilitate improvement in movement, function, and ADLs as indicated by Functional Deficits. []? Progressing: []? Met: [x]? Not Met: []? Adjusted     Long Term Goals: To be achieved in: 8 weeks  1. Disability index score of FOTO to 79 to assist with reaching prior level of function. []? Progressing: []? Met: [x]? Not Met: []? Adjusted  2. Patient will demonstrate an increase in Strength to good proximal hip strength and control, within 5/5 in LE to allow for proper functional mobility as indicated by patients Functional Deficits. []? Progressing: []? Met: [x]? Not Met: []? Adjusted  3.. Patient will return to running on the treadmill without increased symptoms or restriction. []? Progressing: []? Met: [x]? Not Met: []? Adjusted  4. Patient will be able to lift her daughter without pain     []? Progressing: []? Met: [x]? Not Met: []? Adjusted         Progression Towards Functional goals:  [] Patient is progressing as expected towards functional goals listed. [] Progression is slowed due to complexities listed. [] Progression has been slowed due to co-morbidities.   [x] Plan just implemented, too soon to assess goals progression  [] Other:     ASSESSMENT:  See eval    Return to Play: (if applicable)   []  Stage 1: Intro to Strength   []  Stage 2: Return to Run and Strength   []  Stage 3: Return to Jump and Strength   []  Stage 4: Dynamic Strength and Agility   []  Stage 5: Sport Specific Training     []  Ready to Return to Play, Meets All Above Stages   []  Not Ready for Return to Sports   Comments:            Treatment/Activity Tolerance:  [x] Patient tolerated treatment well [] Patient limited by fatique  [] Patient limited by pain  [] Patient limited by other medical complications  [] Other:     Overall Progression Towards Functional goals/ Treatment Progress Update:  [] Patient is progressing as expected towards functional goals listed. [] Progression is slowed due to complexities/Impairments listed. [] Progression has been slowed due to co-morbidities. [x] Plan just implemented, too soon to assess goals progression <30days   [] Goals require adjustment due to lack of progress  [] Patient is not progressing as expected and requires additional follow up with physician  [] Other    Prognosis for POC: [x] Good [] Fair  [] Poor    Patient requires continued skilled intervention: [x] Yes  [] No        PLAN: See eval  [] Continue per plan of care [] Alter current plan (see comments)  [x] Plan of care initiated [] Hold pending MD visit [] Discharge    Electronically signed by: Ganesh Coffey PT    Note: If patient does not return for scheduled/recommended follow up visits, this note will serve as a discharge from care along with the most recent update on progress.

## 2022-04-20 ENCOUNTER — HOSPITAL ENCOUNTER (OUTPATIENT)
Dept: PHYSICAL THERAPY | Age: 37
Setting detail: THERAPIES SERIES
Discharge: HOME | End: 2022-04-20
Payer: COMMERCIAL

## 2022-04-20 PROCEDURE — 97110 THERAPEUTIC EXERCISES: CPT

## 2022-04-20 NOTE — FLOWSHEET NOTE
[facility]    Physical Therapy Treatment Note/ Progress Report:     Date:  2022    Patient Name:  Ángel Horton    :  1985  MRN: [id number]  Medical/Treatment Diagnosis Information:  Diagnosis: M25. 468. Pain of both knees  M25.561, M25 (ICD=10-CM) - Acute pain of both knees   Treatment Diagnosis: Decreased LE strength, R LE pain  Insurance/Certification information:  PT Insurance Information: BC/BS Ductible: [phone] visitsBC/BS   Physician Information:  Referring Practitioner: Dr. Kwame Alexander MD   Plan of care signed (Y/N):      Date of Patient follow up with Physician:      Progress Report: []  Yes  []  No      Functional Scale:  FOTO LE 63    Date: 22    Date Range for reporting period:  Beginnin22  Ending:     Progress report due (10 Rx/or 30 days whichever is less): 64    Recertification due (POC duration/ or 90 days whichever is less): 22    Visit # Insurance Allowable Auth Needed   2 60 []Yes    [x]No     Pain level:  0/10     SUBJECTIVE:  Pt denies pain in the leg for the last two weeks. Pt is complaint with her HEP.  Pt reports that she has changed up her routine in the gym from running on the treadmill to using the elliptical.     OBJECTIVE: See eval   Observation:    Test measurements:       RESTRICTIONS/PRECAUTIONS: NA    Exercises/Interventions:     Therapeutic Ex Resistance Sets/sec Reps Notes   SLR Supine  #2 2 10    S/L Clamshell  Green 2 10    LAQ 3# 2 10    Prone Quad Stretch   4 30 sec     Bridge with hip abduction  Red  2 10  3 sec hold           Calf Stretch on slant board   3  30 sec    Hamstring stretch on edge  3 30 sec    Step-ups 6' 2 10    SLS  3 30 sec           Elliptical     5 minutes                           Therapeutic Activities                                                               Manual Intervention       Knee mobs/PROM       Tib/Fem Mobs       Patella Mobs Ankle mobs                     NMR re-education                                                                          Therapeutic Exercise and NMR EXR  [x] (24133) Provided verbal/tactile cueing for activities related to strengthening, flexibility, endurance, ROM for improvements in LE, proximal hip, and core control with self care, mobility, lifting, ambulation.  [] (32308) Provided verbal/tactile cueing for activities related to improving balance, coordination, kinesthetic sense, posture, motor skill, proprioception  to assist with LE, proximal hip, and core control in self care, mobility, lifting, ambulation and eccentric single leg control.      NMR and Therapeutic Activities:    [] (33540 or 63034) Provided verbal/tactile cueing for activities related to improving balance, coordination, kinesthetic sense, posture, motor skill, proprioception and motor activation to allow for proper function of core, proximal hip and LE with self care and ADLs  [] (60567) Gait Re-education- Provided training and instruction to the patient for proper LE, core and proximal hip recruitment and positioning and eccentric body weight control with ambulation re-education including up and down stairs      Home Exercise Program:    [x] (73562) Reviewed/Progressed HEP activities related to strengthening, flexibility, endurance, ROM of core, proximal hip and LE for functional self-care, mobility, lifting and ambulation/stair navigation   [x] (44756)Reviewed/Progressed HEP activities related to improving balance, coordination, kinesthetic sense, posture, motor skill, proprioception of core, proximal hip and LE for self care, mobility, lifting, and ambulation/stair navigation      Manual Treatments:  PROM / STM / Oscillations-Mobs:  G-I, II, III, IV (PA's, Inf., Post.)  [] (74707) Provided manual therapy to mobilize LE, proximal hip and/or LS spine soft tissue/joints for the purpose of modulating pain, promoting relaxation, increasing ROM, reducing/eliminating soft tissue swelling/inflammation/restriction, improving soft tissue extensibility and allowing for proper ROM for normal function with self care, mobility, lifting and ambulation. Modalities:      Charges:  Timed Code Treatment Minutes: 45   Total Treatment Minutes: 45       [] EVAL (LOW) 79274 (typically 20 minutes face-to-face)  [] EVAL (MOD) 73943 (typically 30 minutes face-to-face)  [] EVAL (HIGH) 83099 (typically 45 minutes face-to-face)  [] RE-EVAL     [x] EV(74741) x 3    [] IONTO (92894)  [] NMR (23051) x     [] VASO (51051)  [] Manual (66720) x     [] Other:  [] TA (89801)x     [] Mech Traction (04849)  [] ES(attended) (99730)     [] ES (un) (19542):      GOALS:  Patient stated goal: To prevent pain from reappearing in R leg   []? Progressing: [x]? Met: []? Not Met: []? Adjusted     Therapist goals for Patient:   Short Term Goals: To be achieved in: 2 weeks  1. Independent in HEP and progression per patient tolerance, in order to prevent re-injury. []? Progressing: [x]? Met: []? Not Met: []? Adjusted  2. Patient will have a decrease in pain to facilitate improvement in movement, function, and ADLs as indicated by Functional Deficits. []? Progressing: [x]? Met: []? Not Met: []? Adjusted     Long Term Goals: To be achieved in: 8 weeks  1. Disability index score of FOTO to 79 to assist with reaching prior level of function. []? Progressing: []? Met: [x]? Not Met: []? Adjusted  2. Patient will demonstrate an increase in Strength to good proximal hip strength and control, within 5/5 in LE to allow for proper functional mobility as indicated by patients Functional Deficits. []? Progressing: []? Met: [x]? Not Met: []? Adjusted  3.. Patient will return to running on the treadmill without increased symptoms or restriction. []? Progressing: [x]? Met: []? Not Met: []? Adjusted  4. Patient will be able to lift her daughter without pain     []? Progressing: [x]?  Met: []? Not Met: []? Adjusted         Progression Towards Functional goals:  [x] Patient is progressing as expected towards functional goals listed. [] Progression is slowed due to complexities listed. [] Progression has been slowed due to co-morbidities. [] Plan just implemented, too soon to assess goals progression  [] Other:     ASSESSMENT:  Pt tolerates exercise program and progression very well this afternoon. Pt denies shooting pain in the front of the leg since her initial evaluation. Pt demonstrates understanding and full capability to complete her home exercise plan to improve strength and decrease pain. Extensive review of progressed HEP. Pt advised to follow-up as needed for exercise progressions and if symptoms re-appear. Pt has met self-reported goals of returning to the gym without restrictions and is functioning at [facility]. Return to Play: (if applicable)   []  Stage 1: Intro to Strength   []  Stage 2: Return to Run and Strength   []  Stage 3: Return to Jump and Strength   []  Stage 4: Dynamic Strength and Agility   []  Stage 5: Sport Specific Training     []  Ready to Return to Play, Meets All Above Stages   []  Not Ready for Return to Sports   Comments:            Treatment/Activity Tolerance:  [x] Patient tolerated treatment well [] Patient limited by fatique  [] Patient limited by pain  [] Patient limited by other medical complications  [] Other:     Overall Progression Towards Functional goals/ Treatment Progress Update:  [x] Patient is progressing as expected towards functional goals listed. [] Progression is slowed due to complexities/Impairments listed. [] Progression has been slowed due to co-morbidities.   [] Plan just implemented, too soon to assess goals progression <30days   [] Goals require adjustment due to lack of progress  [] Patient is not progressing as expected and requires additional follow up with physician  [] Other    Prognosis for POC: [x] Good [] Fair  [] Poor    Patient requires continued skilled intervention: [x] Yes  [] No        PLAN: DC to HEP and follow-up as needed  [] Continue per plan of care [] Alter current plan (see comments)  [] Plan of care initiated [] Hold pending MD visit [x] Discharge    Electronically signed by: Zeferino Ibrahim, SPT     Therapist was present, directed the patient's care, made skilled judgement, and was responsible for assessment and treatment of the patient. Ganesh Coffey, PT    Note: If patient does not return for scheduled/recommended follow up visits, this note will serve as a discharge from care along with the most recent update on progress.

## 2022-04-27 ENCOUNTER — APPOINTMENT (OUTPATIENT)
Dept: PHYSICAL THERAPY | Age: 37
End: 2022-04-27
Payer: COMMERCIAL

## 2022-05-19 DIAGNOSIS — G43.009 MIGRAINE WITHOUT AURA AND WITHOUT STATUS MIGRAINOSUS, NOT INTRACTABLE: ICD-10-CM

## 2022-05-19 RX ORDER — RIZATRIPTAN BENZOATE 10 MG/1
10 TABLET ORAL
Qty: 9 TABLET | Refills: 13 | Status: SHIPPED | OUTPATIENT
Start: 2022-05-19 | End: 2022-06-24

## 2022-05-19 NOTE — TELEPHONE ENCOUNTER
Medication:   Requested Prescriptions     Pending Prescriptions Disp Refills    rizatriptan (MAXALT) 10 MG tablet [Pharmacy Med Name: RIZATRIPTAN 10 MG TABLET] 9 tablet 13     Sig: TAKE 1 TABLET BY MOUTH ONCE AS NEEDED FOR MIGRAINE MAY REPEAT IN 2 HOURS IF NEEDED        Last Filled:  6/28/2021 30 tabs 3 refills     Patient Phone Number: [phone] (home)     Last appt: 3/22/2022   Next appt: Visit date not found    Last OARRS: No flowsheet data found.

## 2022-06-24 ENCOUNTER — OFFICE VISIT (OUTPATIENT)
Dept: FAMILY MEDICINE CLINIC | Age: 37
End: 2022-06-24
Payer: COMMERCIAL

## 2022-06-24 VITALS
HEART RATE: 72 BPM | RESPIRATION RATE: 16 BRPM | TEMPERATURE: 96.8 F | DIASTOLIC BLOOD PRESSURE: 77 MMHG | WEIGHT: 144.8 LBS | BODY MASS INDEX: 24.12 KG/M2 | OXYGEN SATURATION: 100 % | HEIGHT: 65 IN | SYSTOLIC BLOOD PRESSURE: 122 MMHG

## 2022-06-24 DIAGNOSIS — F41.9 ANXIETY: Primary | ICD-10-CM

## 2022-06-24 PROCEDURE — 99213 OFFICE O/P EST LOW 20 MIN: CPT | Performed by: FAMILY MEDICINE

## 2022-06-24 RX ORDER — ESCITALOPRAM OXALATE 10 MG/1
10 TABLET ORAL DAILY
Qty: 30 TABLET | Refills: 3 | Status: SHIPPED | OUTPATIENT
Start: 2022-06-24 | End: 2022-07-18

## 2022-06-24 RX ORDER — HYDROXYZINE PAMOATE 25 MG/1
25 CAPSULE ORAL 3 TIMES DAILY PRN
Qty: 90 CAPSULE | Refills: 0 | Status: SHIPPED | OUTPATIENT
Start: 2022-06-24 | End: 2022-07-18

## 2022-06-24 ASSESSMENT — ANXIETY QUESTIONNAIRES
4. TROUBLE RELAXING: 2
7. FEELING AFRAID AS IF SOMETHING AWFUL MIGHT HAPPEN: 2
5. BEING SO RESTLESS THAT IT IS HARD TO SIT STILL: 3
3. WORRYING TOO MUCH ABOUT DIFFERENT THINGS: 3
IF YOU CHECKED OFF ANY PROBLEMS ON THIS QUESTIONNAIRE, HOW DIFFICULT HAVE THESE PROBLEMS MADE IT FOR YOU TO DO YOUR WORK, TAKE CARE OF THINGS AT HOME, OR GET ALONG WITH OTHER PEOPLE: SOMEWHAT DIFFICULT
2. NOT BEING ABLE TO STOP OR CONTROL WORRYING: 3
GAD7 TOTAL SCORE: 18
6. BECOMING EASILY ANNOYED OR IRRITABLE: 2
1. FEELING NERVOUS, ANXIOUS, OR ON EDGE: 3

## 2022-06-24 NOTE — PROGRESS NOTES
Chief Complaint: Anxiety (inital; increased stress, uncontrolled worrying, and anxious. Feels saying things effecting others but it doesn't, can't sleep if knows something happens the next day. )       HPI:  Brad Holder is a 40 y.o. female here with c/o here to discuss about anxiety. She has been anxious about simple things and at times going out to new places makes her anxious. She has always had history of anxiety but never tried any medication. Denies any symptoms of depression. No change in her routine. ROS:  Constitutional: Negative for appetite change, fatigue, fever and unexpected weight change. HENT: Negative  Respiratory: Negative for cough, chest tightness, shortness of breath and wheezing. Cardiovascular: Negative for chest pain, palpitations and leg swelling. Gastrointestinal: Negative for abdominal pain, blood in stool, constipation, diarrhea, nausea and vomiting. Genitourinary: Negative   Psychiatric/Behavioral: As mentioned above    Patient's problem list, medications, allergies, past medical, surgical, social and family histories were reviewed and updated as appropriate.      Current Outpatient Medications   Medication Sig Dispense Refill    escitalopram (LEXAPRO) 10 MG tablet Take 1 tablet by mouth daily 30 tablet 3    hydrOXYzine pamoate (VISTARIL) 25 MG capsule Take 1 capsule by mouth 3 times daily as needed for Anxiety 90 capsule 0    Levonorgestrel (MIRENA, 52 MG, IU)       rizatriptan (MAXALT) 10 MG tablet TAKE 1 TABLET BY MOUTH ONCE AS NEEDED FOR MIGRAINE MAY REPEAT IN 2 HOURS IF NEEDED 9 tablet 13    lisinopril (PRINIVIL;ZESTRIL) 10 MG tablet TAKE 1 TABLET BY MOUTH EVERY DAY 90 tablet 3    ibuprofen (ADVIL;MOTRIN) 800 MG tablet Take 1 tablet by mouth every 6 hours as needed for Pain 30 tablet 2    butalbital-acetaminophen-caffeine (FIORICET, ESGIC) -40 MG per tablet Take 1 tablet by mouth every 6 hours as needed for Migraine 20 tablet 3     No current facility-administered medications for this visit. Social History     Tobacco Use    Smoking status: Never Smoker    Smokeless tobacco: Never Used   Substance Use Topics    Alcohol use: No     Alcohol/week: 0.0 standard drinks        Objective:     Vitals:    06/24/22 1558   BP: 122/77   Site: Left Upper Arm   Position: Sitting   Cuff Size: Small Adult   Pulse: 72   Resp: 16   Temp: 96.8 °F (36 °C)   TempSrc: Temporal   SpO2: 100%   Weight: 144 lb 12.8 oz (65.7 kg)   Height: 5' 5\" (1.651 m)     Body mass index is 24.1 kg/m². Wt Readings from Last 3 Encounters:   06/24/22 144 lb 12.8 oz (65.7 kg)   04/05/22 147 lb (66.7 kg)   03/22/22 147 lb (66.7 kg)     BP Readings from Last 3 Encounters:   06/24/22 122/77   03/22/22 138/88   02/12/21 (!) 142/102       Physical exam:  Constitutional: she is oriented to person, place, and time. she appears well-developed and well-nourished. No distress. Neck: Normal range of motion. No JVD present. No tracheal deviation present. No thyromegaly present. Cardiovascular: Normal rate, regular rhythm, normal heart sounds and intact distal pulses. No murmur heard. Pulmonary/Chest: Effort normal and breath sounds normal. No stridor. No respiratory distress. she has no wheezes. she has no rales. sheexhibits no tenderness. Skin: Skin is warm and dry. No rash noted. she is not diaphoretic. No erythema. No pallor. Psychiatric: she has a normal mood and affect. her   behavior is normal.      Assessment/Plan:   1. Anxiety  We will start  - escitalopram (LEXAPRO) 10 MG tablet; Take 1 tablet by mouth daily  Dispense: 30 tablet; Refill: 3  - hydrOXYzine pamoate (VISTARIL) 25 MG capsule; Take 1 capsule by mouth 3 times daily as needed for Anxiety  Dispense: 90 capsule;  Refill: 1133 Ishaan Culp MD  6/24/2022 4:27 PM

## 2022-07-17 DIAGNOSIS — F41.9 ANXIETY: ICD-10-CM

## 2022-07-18 RX ORDER — HYDROXYZINE PAMOATE 25 MG/1
25 CAPSULE ORAL 3 TIMES DAILY PRN
Qty: 90 CAPSULE | Refills: 0 | Status: SHIPPED | OUTPATIENT
Start: 2022-07-18 | End: 2022-08-15

## 2022-07-18 RX ORDER — ESCITALOPRAM OXALATE 10 MG/1
TABLET ORAL
Qty: 30 TABLET | Refills: 3 | Status: SHIPPED | OUTPATIENT
Start: 2022-07-18 | End: 2022-10-17

## 2022-07-18 NOTE — TELEPHONE ENCOUNTER
Last OV 6/24/2022   Next OV Visit date not found    Requested Prescriptions     Pending Prescriptions Disp Refills    escitalopram (LEXAPRO) 10 MG tablet [Pharmacy Med Name: ESCITALOPRAM 10 MG TABLET] 30 tablet 3     Sig: TAKE 1 TABLET BY MOUTH EVERY DAY    hydrOXYzine pamoate (VISTARIL) 25 MG capsule [Pharmacy Med Name: HYDROXYZINE MANASA 25 MG CAP] 90 capsule 0     Sig: TAKE 1 CAPSULE BY MOUTH 3 TIMES DAILY AS NEEDED FOR ANXIETY.       Last fill 6/24  pended

## 2022-08-12 DIAGNOSIS — F41.9 ANXIETY: ICD-10-CM

## 2022-08-12 NOTE — TELEPHONE ENCOUNTER
Medication:   Requested Prescriptions     Pending Prescriptions Disp Refills    hydrOXYzine pamoate (VISTARIL) 25 MG capsule [Pharmacy Med Name: HYDROXYZINE MANASA 25 MG CAP] 90 capsule 0     Sig: TAKE 1 CAPSULE BY MOUTH 3 TIMES DAILY AS NEEDED FOR ANXIETY. Last Filled:  7/18/2022 90 caps 0 refills     Patient Phone Number: [phone] (home)     Last appt: 6/24/2022   Next appt: Visit date not found    Last OARRS: No flowsheet data found.

## 2022-08-15 RX ORDER — HYDROXYZINE PAMOATE 25 MG/1
25 CAPSULE ORAL 3 TIMES DAILY PRN
Qty: 90 CAPSULE | Refills: 0 | Status: SHIPPED | OUTPATIENT
Start: 2022-08-15 | End: 2022-09-14

## 2022-08-28 DIAGNOSIS — G43.009 MIGRAINE WITHOUT AURA AND WITHOUT STATUS MIGRAINOSUS, NOT INTRACTABLE: ICD-10-CM

## 2022-08-29 RX ORDER — IBUPROFEN 800 MG/1
800 TABLET ORAL EVERY 6 HOURS PRN
Qty: 30 TABLET | Refills: 2 | Status: SHIPPED | OUTPATIENT
Start: 2022-08-29

## 2022-08-29 NOTE — TELEPHONE ENCOUNTER
Medication:   Requested Prescriptions     Pending Prescriptions Disp Refills    ibuprofen (ADVIL;MOTRIN) 800 MG tablet [Pharmacy Med Name: IBUPROFEN 800 MG TABLET] 30 tablet 2     Sig: TAKE 1 TABLET BY MOUTH EVERY 6 HOURS AS NEEDED FOR PAIN        Last Filled:  4/21/2021 30 tabs 2 refills     Patient Phone Number: [phone] (home)     Last appt: 6/24/2022   Next appt: Visit date not found    Last OARRS: No flowsheet data found.

## 2022-10-13 RX ORDER — LOSARTAN POTASSIUM 25 MG/1
25 TABLET ORAL DAILY
Qty: 30 TABLET | Refills: 5 | Status: SHIPPED | OUTPATIENT
Start: 2022-10-13

## 2022-10-15 DIAGNOSIS — F41.9 ANXIETY: ICD-10-CM

## 2022-10-17 RX ORDER — ESCITALOPRAM OXALATE 10 MG/1
TABLET ORAL
Qty: 90 TABLET | Refills: 1 | Status: SHIPPED | OUTPATIENT
Start: 2022-10-17

## 2022-10-17 NOTE — TELEPHONE ENCOUNTER
Medication:   Requested Prescriptions     Pending Prescriptions Disp Refills    escitalopram (LEXAPRO) 10 MG tablet [Pharmacy Med Name: ESCITALOPRAM 10 MG TABLET] 90 tablet 1     Sig: TAKE 1 TABLET BY MOUTH EVERY DAY        Last Filled:  7/18/2022 30 tabs 3 refills     Patient Phone Number: [phone] (home)     Last appt: 6/24/2022   Next appt: Visit date not found    Last OARRS: No flowsheet data found.

## 2022-11-08 RX ORDER — LOSARTAN POTASSIUM 25 MG/1
TABLET ORAL
Qty: 30 TABLET | Refills: 5 | OUTPATIENT
Start: 2022-11-08

## 2023-01-13 DIAGNOSIS — G43.009 MIGRAINE WITHOUT AURA AND WITHOUT STATUS MIGRAINOSUS, NOT INTRACTABLE: ICD-10-CM

## 2023-01-13 RX ORDER — RIZATRIPTAN BENZOATE 10 MG/1
10 TABLET ORAL
Qty: 9 TABLET | Refills: 5 | Status: SHIPPED | OUTPATIENT
Start: 2023-01-13 | End: 2023-01-13

## 2023-01-13 NOTE — TELEPHONE ENCOUNTER
Medication:   Requested Prescriptions     Pending Prescriptions Disp Refills    rizatriptan (MAXALT) 10 MG tablet 9 tablet 13     Sig: Take 1 tablet by mouth once as needed for Migraine May repeat in 2 hours if needed        Last Filled:  05/19/2022 #9 13rf    Patient Phone Number: [phone] (home)     Last appt: 6/24/2022 - OV  Next appt: Visit date not found    Last OARRS: No flowsheet data found.

## 2023-01-16 RX ORDER — LOSARTAN POTASSIUM 25 MG/1
TABLET ORAL
Qty: 90 TABLET | Refills: 1 | Status: SHIPPED | OUTPATIENT
Start: 2023-01-16

## 2023-01-16 NOTE — TELEPHONE ENCOUNTER
Medication:   Requested Prescriptions     Pending Prescriptions Disp Refills    losartan (COZAAR) 25 MG tablet [Pharmacy Med Name: LOSARTAN POTASSIUM 25 MG TAB] 90 tablet 1     Sig: TAKE 1 TABLET BY MOUTH EVERY DAY       Last Filled:  10/13/2022 #30 5rf    Patient Phone Number: [phone] (home)     Last appt: 6/24/2022   Next appt: Visit date not found    Lab Results   Component Value Date     03/11/2022    K 4.2 03/11/2022     03/11/2022    CO2 25 03/11/2022    BUN 17 03/11/2022    CREATININE 0.8 03/11/2022    GLUCOSE 88 03/11/2022    CALCIUM 10.6 03/11/2022    PROT 7.4 04/25/2018    LABALBU 4.9 04/25/2018    BILITOT 1.1 (H) 04/25/2018    ALKPHOS 122 04/25/2018    AST 20 04/25/2018    ALT 35 04/25/2018    LABGLOM >60 03/11/2022    GFRAA >60 03/11/2022    AGRATIO 2.0 04/25/2018    GLOB 2.5 04/25/2018

## 2023-04-16 DIAGNOSIS — F41.9 ANXIETY: ICD-10-CM

## 2023-04-18 NOTE — TELEPHONE ENCOUNTER
Medication:   Requested Prescriptions     Pending Prescriptions Disp Refills    escitalopram (LEXAPRO) 10 MG tablet [Pharmacy Med Name: ESCITALOPRAM 10 MG TABLET] 90 tablet 1     Sig: TAKE 1 TABLET BY MOUTH EVERY DAY        Last Filled:  10/17/2022 #90 1rf    Patient Phone Number: [phone] (home)     Last appt: 6/24/2022   Next appt: Visit date not found    Last OARRS: No flowsheet data found.

## 2023-04-19 RX ORDER — ESCITALOPRAM OXALATE 10 MG/1
TABLET ORAL
Qty: 90 TABLET | Refills: 1 | Status: SHIPPED | OUTPATIENT
Start: 2023-04-19

## 2023-05-16 RX ORDER — BUPROPION HYDROCHLORIDE 150 MG/1
150 TABLET ORAL EVERY MORNING
Qty: 30 TABLET | Refills: 3 | Status: SHIPPED | OUTPATIENT
Start: 2023-05-16

## 2023-07-08 ASSESSMENT — PATIENT HEALTH QUESTIONNAIRE - PHQ9
SUM OF ALL RESPONSES TO PHQ9 QUESTIONS 1 & 2: 1
1. LITTLE INTEREST OR PLEASURE IN DOING THINGS: 1
SUM OF ALL RESPONSES TO PHQ9 QUESTIONS 1 & 2: 1
SUM OF ALL RESPONSES TO PHQ QUESTIONS 1-9: 1
1. LITTLE INTEREST OR PLEASURE IN DOING THINGS: SEVERAL DAYS
SUM OF ALL RESPONSES TO PHQ QUESTIONS 1-9: 1
SUM OF ALL RESPONSES TO PHQ QUESTIONS 1-9: 1
2. FEELING DOWN, DEPRESSED OR HOPELESS: 0
2. FEELING DOWN, DEPRESSED OR HOPELESS: NOT AT ALL
SUM OF ALL RESPONSES TO PHQ QUESTIONS 1-9: 1

## 2023-07-10 RX ORDER — BUPROPION HYDROCHLORIDE 150 MG/1
TABLET ORAL
Qty: 30 TABLET | Refills: 0 | Status: SHIPPED | OUTPATIENT
Start: 2023-07-10

## 2023-07-10 NOTE — TELEPHONE ENCOUNTER
Medication:   Requested Prescriptions     Pending Prescriptions Disp Refills    buPROPion (WELLBUTRIN XL) 150 MG extended release tablet [Pharmacy Med Name: BUPROPION HCL  MG TABLET] 30 tablet 0     Sig: TAKE 1 TABLET BY MOUTH EVERY DAY IN THE MORNING        Last Filled:  06/12/2023 #30 0rf    Patient Phone Number: [phone] (home)     Last appt: 6/24/2022   Next appt: 7/11/2023    Last OARRS: No flowsheet data found.

## 2023-07-11 ENCOUNTER — OFFICE VISIT (OUTPATIENT)
Dept: FAMILY MEDICINE CLINIC | Age: 38
End: 2023-07-11
Payer: COMMERCIAL

## 2023-07-11 VITALS
WEIGHT: 157.2 LBS | BODY MASS INDEX: 26.19 KG/M2 | HEART RATE: 80 BPM | TEMPERATURE: 97.1 F | SYSTOLIC BLOOD PRESSURE: 140 MMHG | OXYGEN SATURATION: 98 % | DIASTOLIC BLOOD PRESSURE: 94 MMHG | HEIGHT: 65 IN

## 2023-07-11 DIAGNOSIS — F41.9 ANXIETY: ICD-10-CM

## 2023-07-11 DIAGNOSIS — Z00.00 ANNUAL PHYSICAL EXAM: Primary | ICD-10-CM

## 2023-07-11 DIAGNOSIS — G43.009 MIGRAINE WITHOUT AURA AND WITHOUT STATUS MIGRAINOSUS, NOT INTRACTABLE: ICD-10-CM

## 2023-07-11 DIAGNOSIS — I10 PRIMARY HYPERTENSION: ICD-10-CM

## 2023-07-11 PROCEDURE — 99395 PREV VISIT EST AGE 18-39: CPT | Performed by: FAMILY MEDICINE

## 2023-07-11 PROCEDURE — 3080F DIAST BP >= 90 MM HG: CPT | Performed by: FAMILY MEDICINE

## 2023-07-11 PROCEDURE — 3077F SYST BP >= 140 MM HG: CPT | Performed by: FAMILY MEDICINE

## 2023-07-11 SDOH — ECONOMIC STABILITY: HOUSING INSECURITY
IN THE LAST 12 MONTHS, WAS THERE A TIME WHEN YOU DID NOT HAVE A STEADY PLACE TO SLEEP OR SLEPT IN A SHELTER (INCLUDING NOW)?: NO

## 2023-07-11 SDOH — ECONOMIC STABILITY: FOOD INSECURITY: WITHIN THE PAST 12 MONTHS, YOU WORRIED THAT YOUR FOOD WOULD RUN OUT BEFORE YOU GOT MONEY TO BUY MORE.: NEVER TRUE

## 2023-07-11 SDOH — ECONOMIC STABILITY: INCOME INSECURITY: HOW HARD IS IT FOR YOU TO PAY FOR THE VERY BASICS LIKE FOOD, HOUSING, MEDICAL CARE, AND HEATING?: NOT HARD AT ALL

## 2023-07-11 SDOH — ECONOMIC STABILITY: FOOD INSECURITY: WITHIN THE PAST 12 MONTHS, THE FOOD YOU BOUGHT JUST DIDN'T LAST AND YOU DIDN'T HAVE MONEY TO GET MORE.: NEVER TRUE

## 2023-07-11 NOTE — PROGRESS NOTES
Chief Complaint: Annual Exam       HPI: She is here for annual physical exam.  She has history of anxiety, migraine and hypertension. Anxiety currently well controlled with Wellbutrin extended release 150 mg daily. Denies any concern. Today her blood pressure is slightly elevated and she has been taking losartan 25 mg daily. She denies checking the blood pressure at home. Denies having any symptoms. She is moving and has been stressful. Her migraines have been well controlled currently only takes as needed medication Maxalt 10 mg      Patient's problem list, medications, allergies, past medical, surgical, social and family histories were reviewed and updated as appropriate. Current Outpatient Medications   Medication Sig Dispense Refill    buPROPion (WELLBUTRIN XL) 150 MG extended release tablet TAKE 1 TABLET BY MOUTH EVERY DAY IN THE MORNING 30 tablet 0    losartan (COZAAR) 25 MG tablet TAKE 1 TABLET BY MOUTH EVERY DAY 90 tablet 1    rizatriptan (MAXALT) 10 MG tablet Take 1 tablet by mouth once as needed for Migraine May repeat in 2 hours if needed 9 tablet 5    ibuprofen (ADVIL;MOTRIN) 800 MG tablet TAKE 1 TABLET BY MOUTH EVERY 6 HOURS AS NEEDED FOR PAIN 30 tablet 2    Levonorgestrel (MIRENA, 52 MG, IU)        No current facility-administered medications for this visit. Social History     Tobacco Use    Smoking status: Never    Smokeless tobacco: Never   Substance Use Topics    Alcohol use: No            Review of Systems:  Constitutional: Negative  Respiratory: Negative for cough, chest tightness, shortness of breath and wheezing. Cardiovascular: Negative for chest pain, palpitations and leg swelling. Gastrointestinal: Negative for abdominal pain, blood in stool, constipation, diarrhea, nausea and vomiting. Genitourinary: Negative for difficulty urinating, flank pain, frequency, hematuria and urgency. Musculoskeletal: Negative for gait problem, joint swelling and myalgias.    Skin:

## 2023-07-15 DIAGNOSIS — Z00.00 ANNUAL PHYSICAL EXAM: ICD-10-CM

## 2023-07-15 LAB
ANION GAP SERPL CALCULATED.3IONS-SCNC: 7 MMOL/L (ref 3–16)
BASOPHILS # BLD: 0.1 K/UL (ref 0–0.2)
BASOPHILS NFR BLD: 1 %
BUN SERPL-MCNC: 15 MG/DL (ref 7–20)
CALCIUM SERPL-MCNC: 9.7 MG/DL (ref 8.3–10.6)
CHLORIDE SERPL-SCNC: 105 MMOL/L (ref 99–110)
CHOLEST SERPL-MCNC: 221 MG/DL (ref 0–199)
CO2 SERPL-SCNC: 28 MMOL/L (ref 21–32)
CREAT SERPL-MCNC: 0.8 MG/DL (ref 0.6–1.1)
DEPRECATED RDW RBC AUTO: 14.2 % (ref 12.4–15.4)
EOSINOPHIL # BLD: 0.1 K/UL (ref 0–0.6)
EOSINOPHIL NFR BLD: 1 %
GFR SERPLBLD CREATININE-BSD FMLA CKD-EPI: >60 ML/MIN/{1.73_M2}
GLUCOSE SERPL-MCNC: 94 MG/DL (ref 70–99)
HCT VFR BLD AUTO: 39.3 % (ref 36–48)
HDLC SERPL-MCNC: 43 MG/DL (ref 40–60)
HGB BLD-MCNC: 13.6 G/DL (ref 12–16)
LDLC SERPL CALC-MCNC: 164 MG/DL
LYMPHOCYTES # BLD: 1.2 K/UL (ref 1–5.1)
LYMPHOCYTES NFR BLD: 13 %
MCH RBC QN AUTO: 30.2 PG (ref 26–34)
MCHC RBC AUTO-ENTMCNC: 34.5 G/DL (ref 31–36)
MCV RBC AUTO: 87.6 FL (ref 80–100)
MONOCYTES # BLD: 0.9 K/UL (ref 0–1.3)
MONOCYTES NFR BLD: 10 %
MYELOCYTES NFR BLD MANUAL: 2 %
NEUTROPHILS # BLD: 6.8 K/UL (ref 1.7–7.7)
NEUTROPHILS NFR BLD: 72 %
NEUTS BAND NFR BLD MANUAL: 1 % (ref 0–7)
PLATELET # BLD AUTO: 275 K/UL (ref 135–450)
PLATELET BLD QL SMEAR: ADEQUATE
PMV BLD AUTO: 10.4 FL (ref 5–10.5)
POTASSIUM SERPL-SCNC: 5.2 MMOL/L (ref 3.5–5.1)
RBC # BLD AUTO: 4.49 M/UL (ref 4–5.2)
RBC MORPH BLD: NORMAL
SLIDE REVIEW: ABNORMAL
SODIUM SERPL-SCNC: 140 MMOL/L (ref 136–145)
TRIGL SERPL-MCNC: 72 MG/DL (ref 0–150)
VLDLC SERPL CALC-MCNC: 14 MG/DL
WBC # BLD AUTO: 9.1 K/UL (ref 4–11)

## 2023-07-18 DIAGNOSIS — I10 PRIMARY HYPERTENSION: Primary | ICD-10-CM

## 2023-07-24 ENCOUNTER — PATIENT MESSAGE (OUTPATIENT)
Dept: FAMILY MEDICINE CLINIC | Age: 38
End: 2023-07-24

## 2023-07-24 NOTE — TELEPHONE ENCOUNTER
From: Geovany Luque  To: Dr. Fredericka Libman: 7/24/2023 11:29 AM EDT  Subject: Change location     Good morning,     Can we please have all medications sent to [address] [place] [address]? Cali Valencia.  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

## 2023-08-05 RX ORDER — LOSARTAN POTASSIUM 25 MG/1
TABLET ORAL
Qty: 90 TABLET | Refills: 1 | OUTPATIENT
Start: 2023-08-05

## 2023-08-07 RX ORDER — BUPROPION HYDROCHLORIDE 150 MG/1
TABLET ORAL
Qty: 30 TABLET | Refills: 5 | OUTPATIENT
Start: 2023-08-07

## 2023-08-26 DIAGNOSIS — G43.009 MIGRAINE WITHOUT AURA AND WITHOUT STATUS MIGRAINOSUS, NOT INTRACTABLE: ICD-10-CM

## 2023-08-28 RX ORDER — RIZATRIPTAN BENZOATE 10 MG/1
TABLET ORAL
Qty: 1 TABLET | Refills: 0 | Status: SHIPPED | OUTPATIENT
Start: 2023-08-28 | End: 2023-08-29 | Stop reason: SDUPTHER

## 2023-08-28 NOTE — TELEPHONE ENCOUNTER
Medication:   Requested Prescriptions     Pending Prescriptions Disp Refills    rizatriptan (MAXALT) 10 MG tablet [Pharmacy Med Name: Rizatriptan Benzoate Oral Tablet 10 MG] 1 tablet 0     Sig: Take 1 tablet by mouth once as needed for Migraine        Last Filled:  08/02/2023 #30 2rf    Patient Phone Number: [phone] (home)     Last appt: 7/11/2023   Next appt: Visit date not found    Last OARRS: No flowsheet data found. Spoke with [address], dates & times confirmed

## 2023-08-29 ENCOUNTER — PATIENT MESSAGE (OUTPATIENT)
Dept: FAMILY MEDICINE CLINIC | Age: 38
End: 2023-08-29

## 2023-08-29 DIAGNOSIS — G43.009 MIGRAINE WITHOUT AURA AND WITHOUT STATUS MIGRAINOSUS, NOT INTRACTABLE: ICD-10-CM

## 2023-08-29 RX ORDER — RIZATRIPTAN BENZOATE 10 MG/1
10 TABLET ORAL
Qty: 20 TABLET | Refills: 4 | Status: SHIPPED | OUTPATIENT
Start: 2023-08-29 | End: 2023-08-29

## 2023-08-29 NOTE — TELEPHONE ENCOUNTER
From: Lisa Morales  To: Dr. Lenny Hallman: 8/29/2023 5:04 PM EDT  Subject: One pill at a time? Hi,     Why am I only getting one pill at a time for the Rizatriptan from [facility]?

## 2023-08-30 DIAGNOSIS — G43.009 MIGRAINE WITHOUT AURA AND WITHOUT STATUS MIGRAINOSUS, NOT INTRACTABLE: ICD-10-CM

## 2023-10-07 DIAGNOSIS — I10 PRIMARY HYPERTENSION: ICD-10-CM

## 2023-10-07 LAB
BASOPHILS # BLD: 0.1 K/UL (ref 0–0.2)
BASOPHILS NFR BLD: 1 %
DEPRECATED RDW RBC AUTO: 13.9 % (ref 12.4–15.4)
EOSINOPHIL # BLD: 0.2 K/UL (ref 0–0.6)
EOSINOPHIL NFR BLD: 1.7 %
HCT VFR BLD AUTO: 43.2 % (ref 36–48)
HGB BLD-MCNC: 14.1 G/DL (ref 12–16)
LYMPHOCYTES # BLD: 1.2 K/UL (ref 1–5.1)
LYMPHOCYTES NFR BLD: 10.8 %
MCH RBC QN AUTO: 28.9 PG (ref 26–34)
MCHC RBC AUTO-ENTMCNC: 32.6 G/DL (ref 31–36)
MCV RBC AUTO: 88.5 FL (ref 80–100)
MONOCYTES # BLD: 0.9 K/UL (ref 0–1.3)
MONOCYTES NFR BLD: 8.2 %
NEUTROPHILS # BLD: 8.9 K/UL (ref 1.7–7.7)
NEUTROPHILS NFR BLD: 78.3 %
PLATELET # BLD AUTO: 330 K/UL (ref 135–450)
PMV BLD AUTO: 11.3 FL (ref 5–10.5)
RBC # BLD AUTO: 4.89 M/UL (ref 4–5.2)
WBC # BLD AUTO: 11.4 K/UL (ref 4–11)

## 2023-10-09 ENCOUNTER — PATIENT MESSAGE (OUTPATIENT)
Dept: FAMILY MEDICINE CLINIC | Age: 38
End: 2023-10-09

## 2023-10-09 DIAGNOSIS — D72.829 LEUKOCYTOSIS, UNSPECIFIED TYPE: Primary | ICD-10-CM

## 2023-10-09 NOTE — TELEPHONE ENCOUNTER
Please advise, thank you!      Component      Latest Ref Rng 10/7/2023   WBC      4.0 - 11.0 K/uL 11.4 (H)    RBC      4.00 - 5.20 M/uL 4.89    Hemoglobin Quant      12.0 - 16.0 g/dL 14.1    Hematocrit      36.0 - 48.0 % 43.2    MCV      80.0 - 100.0 fL 88.5    MCH      26.0 - 34.0 pg 28.9    MCHC      31.0 - 36.0 g/dL 32.6    RDW      12.4 - 15.4 % 13.9    Platelet Count      953 - 450 K/uL 330    MPV      5.0 - 10.5 fL 11.3 (H)    Neutrophils %      % 78.3    Lymphocyte %      % 10.8    Monocytes %      % 8.2    Eosinophils %      % 1.7    Basophils %      % 1.0    Neutrophils Absolute      1.7 - 7.7 K/uL 8.9 (H)    Lymphocytes Absolute      1.0 - 5.1 K/uL 1.2    Monocytes Absolute      0.0 - 1.3 K/uL 0.9    Eosinophils Absolute      0.0 - 0.6 K/uL 0.2    Basophils Absolute      0.0 - 0.2 K/uL 0.1

## 2023-12-14 DIAGNOSIS — F41.9 ANXIETY: Primary | ICD-10-CM

## 2023-12-14 RX ORDER — ESCITALOPRAM OXALATE 20 MG/1
20 TABLET ORAL DAILY
Qty: 90 TABLET | Refills: 0 | Status: SHIPPED | OUTPATIENT
Start: 2023-12-14 | End: 2024-03-13

## 2023-12-27 ENCOUNTER — TELEPHONE (OUTPATIENT)
Dept: FAMILY MEDICINE CLINIC | Age: 38
End: 2023-12-27

## 2023-12-27 NOTE — TELEPHONE ENCOUNTER
----- Message from Jennifer Castillo MD sent at 12/26/2023  9:34 AM EST -----  She needs hospital follow up if her symptoms are not resolved.     Dr Adia Ann

## 2023-12-27 NOTE — TELEPHONE ENCOUNTER
[address] [facility] Transitions Initial Follow Up Call    Outreach made within 2 business days of discharge: Yes    Patient: Miguel Sun Patient : 1985   MRN: [id number]  Reason for Admission: Pancreatic lesion/acute diverticulitis    Discharge Date: 18       Spoke with: Kranthi Pritchard (patient)    Discharge department/facility: [facility]     TCM Interactive Patient Contact:  Was patient able to fill all prescriptions: Yes  Was patient instructed to bring all medications to the follow-up visit: Yes  Is patient taking all medications as directed in the discharge summary?  Yes  Does patient understand their discharge instructions: Yes  Does patient have questions or concerns that need addressed prior to 7-14 day follow up office visit: no    Scheduled appointment with PCP within 7-14 days    Follow Up  Future Appointments   Date Time Provider [address] [place] [address]   2024  2:00 PM Sabina Wei MD [facility] Juan David Gómez MA

## 2024-01-10 ENCOUNTER — TELEPHONE (OUTPATIENT)
Dept: FAMILY MEDICINE CLINIC | Age: 39
End: 2024-01-10

## 2024-01-10 ENCOUNTER — OFFICE VISIT (OUTPATIENT)
Dept: FAMILY MEDICINE CLINIC | Age: 39
End: 2024-01-10
Payer: COMMERCIAL

## 2024-01-10 VITALS
SYSTOLIC BLOOD PRESSURE: 128 MMHG | DIASTOLIC BLOOD PRESSURE: 84 MMHG | OXYGEN SATURATION: 97 % | HEIGHT: 65 IN | HEART RATE: 83 BPM | WEIGHT: 147 LBS | BODY MASS INDEX: 24.49 KG/M2

## 2024-01-10 DIAGNOSIS — N63.0 FIBROUS BREAST LUMPS: ICD-10-CM

## 2024-01-10 DIAGNOSIS — K57.92 DIVERTICULITIS: Primary | ICD-10-CM

## 2024-01-10 DIAGNOSIS — I10 PRIMARY HYPERTENSION: ICD-10-CM

## 2024-01-10 DIAGNOSIS — D72.829 LEUKOCYTOSIS, UNSPECIFIED TYPE: ICD-10-CM

## 2024-01-10 DIAGNOSIS — K86.2 PANCREATIC CYST: ICD-10-CM

## 2024-01-10 DIAGNOSIS — F41.9 ANXIETY: ICD-10-CM

## 2024-01-10 DIAGNOSIS — N63.0 FIBROUS BREAST LUMPS: Primary | ICD-10-CM

## 2024-01-10 PROCEDURE — 3074F SYST BP LT 130 MM HG: CPT | Performed by: FAMILY MEDICINE

## 2024-01-10 PROCEDURE — 1111F DSCHRG MED/CURRENT MED MERGE: CPT | Performed by: FAMILY MEDICINE

## 2024-01-10 PROCEDURE — 99214 OFFICE O/P EST MOD 30 MIN: CPT | Performed by: FAMILY MEDICINE

## 2024-01-10 PROCEDURE — 3079F DIAST BP 80-89 MM HG: CPT | Performed by: FAMILY MEDICINE

## 2024-01-10 ASSESSMENT — PATIENT HEALTH QUESTIONNAIRE - PHQ9
2. FEELING DOWN, DEPRESSED OR HOPELESS: 0
1. LITTLE INTEREST OR PLEASURE IN DOING THINGS: 0
SUM OF ALL RESPONSES TO PHQ QUESTIONS 1-9: 0
SUM OF ALL RESPONSES TO PHQ QUESTIONS 1-9: 0
SUM OF ALL RESPONSES TO PHQ9 QUESTIONS 1 & 2: 0
SUM OF ALL RESPONSES TO PHQ QUESTIONS 1-9: 0
SUM OF ALL RESPONSES TO PHQ QUESTIONS 1-9: 0

## 2024-01-10 NOTE — TELEPHONE ENCOUNTER
Patient attempted to schedule US of breasts, however they will also need order for diagnostic mammogram.     Please order and inform patient so that she can move forward with scheduling.

## 2024-01-10 NOTE — PROGRESS NOTES
HIV screen  Completed    Hepatitis A vaccine  Aged Out    Hib vaccine  Aged Out    Polio vaccine  Aged Out    Meningococcal (ACWY) vaccine  Aged Out    Pneumococcal 0-64 years Vaccine  Aged Out          Assessment/Plan:     1. Diverticulitis  - AFL - Janeen Sanchez MD, Gastroenterology, [facility]    2. Fibrous breast lumps  Also get the diagnostic mammogram.  - US BREAST COMPLETE LEFT; Future  - US BREAST COMPLETE RIGHT; Future    3. Primary hypertension  Stable    4. Pancreatic cyst  Scheduled to get MRI    5. Anxiety  Continue Lexapro 20 mg daily and Wellbutrin extended release 150 mg daily    6.  Leukocytosis  Followed up with the [facility].  All the test so far negative.         Molly Cobb MD  1/11/2024 5:31 PM

## 2024-01-10 NOTE — PATIENT INSTRUCTIONS
[facility] Ambulatory Established Patient Note    Chief Complaint   Patient presents with    Office Visit    Prostate Cancer         Subjective:  Klever Rust is a 51 year old male.  Per chart review of old records, lab values/study results, and patient-given history:      Prostate cancer  - Patient had a PSA of 6.35 on 10/10/23, mildly elevated for his age.  This was not in the context of UTI or  instrumentation.  + family history of prostate cancer in father.  PSA elevation confirmed on repeat.  We agreed to pursue biopsy 12/22/23 showing small volume GS 3+4.       PSA:    Prostate Specific Antigen (ng/mL)   Date Value   11/21/2023 6.16 (H)   10/10/2023 6.35 (H)     Component      Latest Ref Rng 11/21/2023  5:51 PM   PSA Free      ng/mL 0.35    PSA Percent Free      % 5.68                Past Medical History:   Diagnosis Date    Elevated blood pressure reading without diagnosis of hypertension 05/28/2019    Elevated PSA     Intervertebral lumbar disc disorder with myelopathy, lumbar region 2010    Other and unspecified hyperlipidemia 2006    Prediabetes 2012    A1c 6.2    Prostate cancer (CMD) 12/28/2023       Past Surgical History:   Procedure Laterality Date    Biopsy of prostate,needle/punch  12/22/2023    GS: RM 3+3=6, LA 3+4=7    Colonoscopy w biopsy  11/06/2006    Dr. Padgett/normal    Esophagogastroduodenoscopy transoral flex w/bx single or mult  11/06/2006    Dr. Padgett/esophagitis/gastritis       ALLERGIES:  No Known Allergies    Family History   Problem Relation Age of Onset    Depression Mother     Hyperlipidemia Father     Patient is unaware of any medical problems Sister     Patient is unaware of any medical problems Brother     Hyperlipidemia Brother     Patient is unaware of any medical problems Daughter     Patient is unaware of any medical problems Son     Patient is unaware of any medical problems Son     Patient is unaware of any medical problems Son     Patient is unaware of any  Patient Education        Neck Spasm: Exercises  Introduction  Here are some examples of exercises for you to try. The exercises may be suggested for a condition or for rehabilitation. Start each exercise slowly. Ease off the exercises if you start to have pain. You will be told when to start these exercises and which ones will work best for you. How to do the exercises  Levator scapula stretch   1. Sit in a firm chair, or stand up straight. 2. Gently tilt your head toward your left shoulder. 3. Turn your head to look down into your armpit, bending your head slightly forward. Let the weight of your head stretch your neck muscles. 4. Hold for 15 to 30 seconds. 5. Return to your starting position. 6. Follow the same instructions above, but tilt your head toward your right shoulder. 7. Repeat 2 to 4 times toward each shoulder. Upper trapezius stretch   1. Sit in a firm chair, or stand up straight. 2. This stretch works best if you keep your shoulder down as you lean away from it. To help you remember to do this, start by relaxing your shoulders and lightly holding on to your thighs or your chair. 3. Tilt your head toward your shoulder and hold for 15 to 30 seconds. Let the weight of your head stretch your muscles. 4. If you would like a little added stretch, place your arm behind your back. Use the arm opposite of the direction you are tilting your head. For example, if you are tilting your head to the left, place your right arm behind your back. 5. Repeat 2 to 4 times toward each shoulder. Neck rotation   1. Sit in a firm chair, or stand up straight. 2. Keeping your chin level, turn your head to the right, and hold for 15 to 30 seconds. 3. Turn your head to the left, and hold for 15 to 30 seconds. 4. Repeat 2 to 4 times to each side. Chin tuck   1. Lie on the floor with a rolled-up towel under your neck. Your head should be touching the floor.   2. Slowly bring your chin toward the front of medical problems Son     Diabetes Maternal Aunt         heart disease 50s,  age 50s       Social History     Tobacco Use    Smoking status: Never    Smokeless tobacco: Never   Substance Use Topics    Alcohol use: Yes     Comment: social    Drug use: No       No current outpatient medications on file.     No current facility-administered medications for this visit.         Physical Exam:    Constitutional:  The patient is well nourished, in no acute distress, appears stated age.   Psychiatric:  Normal mood/affect.      Results Reviewed:    Surgical Pathology: [id number]  Order: [id number]  Collected 2023 13:56       Status: Final result       Visible to patient: Yes (seen)       Dx: Elevated PSA    0 Result Notes       Component  Resulting Agency   Pathologic Diagnosis                                ** MALIGNANT **     A.   Prostate, right base x 2, biopsy:  -Benign prostate tissue.     B.   Prostate, right mid x 2, biopsy:  -Prostatic adenocarcinoma, Grade group 1 (Hawa score 3+3=6/10), in 1 of 2 cores, involving <10% of affected core.     C.   Prostate, right apex x 2, biopsy:  -Benign prostate tissue.     D.   Prostate, left base x 2, biopsy:  -Benign prostate tissue.     E.   Prostate, left mid x 2, biopsy:  -Benign prostate tissue.     F.   Prostate, left apex x 2, biopsy:  -Prostatic adenocarcinoma, Grade group 2 (Everett score 3+4=7/10), in 1 of 2 cores, discontinuously involving 30% of affected core.  -Hawa pattern 4 comprises 10% of tumor volume.         Electronically signed by Lorena Monk MD on 2023 at 1433          Recent Labs   Lab 23  1212   WBC 8.0   HGB 13.5   HCT 42.0     Recent Labs   Lab 10/10/23  0832   Sodium 141   Potassium 4.2   Chloride 104   Carbon Dioxide 28   BUN 15   Creatinine 1.03   Glucose 109*   Albumin 4.6     No results for input(s): \"PTT\", \"PT\", \"INR\" in the last 8765 hours.      U/A:    Component      Latest Ref Rng 2023  12:01 PM   COLOR  your neck. 3. Hold for a count of 6, and then relax for up to 10 seconds. 4. Repeat 8 to 12 times. Forward neck flexion   1. Sit in a firm chair, or stand up straight. 2. Bend your head forward. 3. Hold for 15 to 30 seconds, then return to your starting position. 4. Repeat 2 to 4 times. Follow-up care is a key part of your treatment and safety. Be sure to make and go to all appointments, and call your doctor if you are having problems. It's also a good idea to know your test results and keep a list of the medicines you take. Where can you learn more? Go to [email or web address]. org and sign in to your [employer] account. Enter P962 in the Zipcar box to learn more about \"Neck Spasm: Exercises. \"     If you do not have an account, please click on the \"Sign Up Now\" link. Current as of: June 26, 2019  Content Version: 12.3  © 7201-9521 Healthwise, Incorporated. Care instructions adapted under license by [facility] ([facility]). If you have questions about a medical condition or this instruction, always ask your healthcare professional. [place] [address] any warranty or liability for your use of this information. Yellow    CLARITY Clear    GLUCOSE(URINE)      Negative mg/dL Negative    BILIRUBIN      Negative  Negative    KETONES      Negative mg/dL Negative    SPECIFIC GRAVITY      1.005 - 1.030  >=1.030    BLOOD      Negative  Small !    pH      5.0 - 7.0  5.5    PROTEIN(URINE)      Negative mg/dL Negative    UROBILINOGEN      0.2, 1.0 mg/dL 0.2    NITRITE      Negative  Negative    LEUKOCYTE ESTERASE      Negative  Negative    Squamous EPI'S      None Seen, 1 to 5 /hpf None Seen    ERYTHROCYTES, URINALYSIS      None Seen, 1 to 2 /hpf None Seen    LEUKOCYTES, URINALYSIS      None Seen, 1 to 5 /hpf None Seen    BACTERIA, URINALYSIS      None Seen /hpf None Seen    HYALINE CASTS, URINALYSIS      None Seen, 1 to 5 /lpf None Seen    MUCOUS Present       Legend:  ! Abnormal      PSA:    Prostate Specific Antigen (ng/mL)   Date Value   11/21/2023 6.16 (H)   10/10/2023 6.35 (H)     Component      Latest Ref Rng 11/21/2023  5:51 PM   PSA Free      ng/mL 0.35    PSA Percent Free      % 5.68                Imaging Studies (personally reviewed):    n/a  _________________________________________________________________________      I personally reviewed the following results during this encounter:  BMP  PSA  Free PSA  UA  Pathology      Assessment/Plan:  Klever Rust is a 51 year old male with the following urologic issues:    1)  Prostate cancer  New Problem; my clinic and I will plan to be the continuing focal point for all health care services required to provide ongoing care related to this urologic condition; Workup Planned as Follows:  - The prostate cancer risk stratification per the NCCN Guidelines is as follows:    Clinically localized:  Very Low Risk - T1c, Hawa < 7, PSA < 10, fewer than 3 positive cores with <50% cancer per core, PSA density < 0.15 ng/mL/g  Low Risk - T1-T2a, Hawa < 7, PSA < 10  Favorable Intermediate Risk - I9d-Y2q OR [place] 3+4 OR PSA 10-20 AND Percentage of positive biopsy cores <  50%  Unfavorable Intermediate Risk - W6a-O1k OR [place] 3+4 or 4+3 OR PSA 10-20  High Risk - T3a OR [place] 8 or 4+5 OR PSA > 20  Very High Risk - T3b or T4 OR Primary Hawa 5 OR > 4 cores with GS 8-10/grade group 4 or 5  Regional/Metastatic - + nodes or metastasis    Based on this stratification, our patient falls into the favorable intermediate risk category.      I explained to the patient the meaning of the Hawa score, which is based on the architectural pattern of the prostate glands from his biopsy sample, and how the two most abundant patterns are graded from 1-5 and the patient is given a score.  I also described how prostate cancer comes in a wide range of severities, from very low risk to very high risk/metastatic prostate cancer, and how higher risk cancers based on [place] score, PSA, physical exam findings, and extent of cancer on biopsy portend a higher risk of spread outside the prostate, and thus necessitate further workup with imaging (CT abdomen/pelvis and bone scan).  I also described how the treatment options differ based on the severity of the prostate cancer.  Based on his risk profile, he does not  need further imaging with CT and/or bone scan.  However, if he chooses prostatectomy or surveillance, we may pursue an MRI for surgical planning purposes or to ensure no missed lesions (in the case of surveillance).  We thoroughly discussed surveillance and prostatectomy, and although the [place] 4 component is quite low, it's likely that at some point his path could change and warrant treatment and he'd consider just doing this up front.  We agreed to have him meet with Dr. Sands to discuss possible prostatectomy.  Referral placed.          - Instructions provided as documented in the After Visit Summary.  - The patient, significant other indicated understanding of the diagnosis and agreed with the plan of care.         Thank you for allowing me to participate in the care of this  patient.  Drew Gates MD

## 2024-01-11 PROBLEM — D72.819 LEUKOPENIA: Status: ACTIVE | Noted: 2024-01-11

## 2024-01-11 PROBLEM — K86.2 PANCREATIC CYST: Status: ACTIVE | Noted: 2024-01-11

## 2024-01-11 PROBLEM — N63.0 FIBROUS BREAST LUMPS: Status: ACTIVE | Noted: 2024-01-11

## 2024-01-11 PROBLEM — K57.92 DIVERTICULITIS: Status: ACTIVE | Noted: 2024-01-11

## 2024-02-15 ENCOUNTER — HOSPITAL ENCOUNTER (OUTPATIENT)
Dept: ULTRASOUND IMAGING | Age: 39
Discharge: HOME | End: 2024-02-15
Payer: COMMERCIAL

## 2024-02-15 ENCOUNTER — ANCILLARY ORDERS (OUTPATIENT)
Dept: FAMILY MEDICINE CLINIC | Age: 39
End: 2024-02-15

## 2024-02-15 ENCOUNTER — HOSPITAL ENCOUNTER (OUTPATIENT)
Dept: WOMENS IMAGING | Age: 39
Discharge: HOME | End: 2024-02-15
Payer: COMMERCIAL

## 2024-02-15 VITALS — WEIGHT: 144 LBS | HEIGHT: 65 IN | BODY MASS INDEX: 23.99 KG/M2

## 2024-02-15 DIAGNOSIS — I10 PRIMARY HYPERTENSION: ICD-10-CM

## 2024-02-15 DIAGNOSIS — F41.9 ANXIETY: ICD-10-CM

## 2024-02-15 DIAGNOSIS — K57.92 DIVERTICULITIS: Primary | ICD-10-CM

## 2024-02-15 DIAGNOSIS — N63.0 FIBROUS BREAST LUMPS: ICD-10-CM

## 2024-02-15 DIAGNOSIS — K86.2 PANCREATIC CYST: ICD-10-CM

## 2024-02-15 DIAGNOSIS — D72.829 LEUKOCYTOSIS, UNSPECIFIED TYPE: ICD-10-CM

## 2024-02-15 PROCEDURE — 76642 ULTRASOUND BREAST LIMITED: CPT

## 2024-02-15 PROCEDURE — G0279 TOMOSYNTHESIS, MAMMO: HCPCS

## 2024-02-22 RX ORDER — BUPROPION HYDROCHLORIDE 150 MG/1
150 TABLET ORAL EVERY MORNING
Qty: 30 TABLET | Refills: 0 | Status: SHIPPED | OUTPATIENT
Start: 2024-02-22

## 2024-02-22 NOTE — TELEPHONE ENCOUNTER
Medication:   Requested Prescriptions     Pending Prescriptions Disp Refills    buPROPion (WELLBUTRIN XL) 150 MG extended release tablet [Pharmacy Med Name: buPROPion HCl ER (XL) Oral Tablet Extended Release 24 Hour 150 MG] 30 tablet 0     Sig: Take 1 tablet by mouth every morning        Last Filled:  08/02/2023 #30 5rf    Patient Phone Number: [phone] (home)     Last appt: 1/10/2024   Next appt: Visit date not found    Last OARRS:        No data to display

## 2024-03-13 RX ORDER — LOSARTAN POTASSIUM 25 MG/1
25 TABLET ORAL DAILY
Qty: 90 TABLET | Refills: 0 | Status: SHIPPED | OUTPATIENT
Start: 2024-03-13

## 2024-03-13 NOTE — TELEPHONE ENCOUNTER
Medication:   Requested Prescriptions     Pending Prescriptions Disp Refills    losartan (COZAAR) 25 MG tablet [Pharmacy Med Name: Losartan Potassium Oral Tablet 25 MG] 90 tablet 0     Sig: Take 1 tablet by mouth daily       Last Filled:  08/02/2023 #90 1rf    Patient Phone Number: [phone] (home)     Last appt: 1/10/2024   Next appt: Visit date not found    Lab Results   Component Value Date     07/15/2023    K 5.2 (H) 07/15/2023     07/15/2023    CO2 28 07/15/2023    BUN 15 07/15/2023    CREATININE 0.8 07/15/2023    GLUCOSE 94 07/15/2023    CALCIUM 9.7 07/15/2023    PROT 7.4 04/25/2018    LABALBU 4.9 04/25/2018    BILITOT 1.1 (H) 04/25/2018    ALKPHOS 122 04/25/2018    AST 20 04/25/2018    ALT 35 04/25/2018    LABGLOM >60 07/15/2023    GFRAA >60 03/11/2022    AGRATIO 2.0 04/25/2018    GLOB 2.5 04/25/2018

## 2024-04-27 NOTE — TELEPHONE ENCOUNTER
Medication:   Requested Prescriptions     Pending Prescriptions Disp Refills    buPROPion (WELLBUTRIN XL) 150 MG extended release tablet [Pharmacy Med Name: buPROPion HCl ER (XL) Oral Tablet Extended Release 24 Hour 150 MG] 30 tablet 0     Sig: TAKE 1 TABLET BY MOUTH IN THE MORNING        Last Filled:  02/22/2024 #30 0rf    Patient Phone Number: [phone] (home)     Last appt: 1/10/2024   Next appt: Visit date not found    Last OARRS:        No data to display

## 2024-04-29 RX ORDER — BUPROPION HYDROCHLORIDE 150 MG/1
150 TABLET ORAL EVERY MORNING
Qty: 90 TABLET | Refills: 1 | Status: SHIPPED | OUTPATIENT
Start: 2024-04-29

## 2024-04-29 RX ORDER — BUPROPION HYDROCHLORIDE 150 MG/1
150 TABLET ORAL EVERY MORNING
Qty: 30 TABLET | Refills: 0 | Status: SHIPPED | OUTPATIENT
Start: 2024-04-29 | End: 2024-04-29 | Stop reason: SDUPTHER

## 2024-06-10 RX ORDER — LOSARTAN POTASSIUM 25 MG/1
25 TABLET ORAL DAILY
Qty: 90 TABLET | Refills: 0 | Status: SHIPPED | OUTPATIENT
Start: 2024-06-10

## 2024-06-10 NOTE — TELEPHONE ENCOUNTER
Medication:   Requested Prescriptions     Pending Prescriptions Disp Refills    losartan (COZAAR) 25 MG tablet [Pharmacy Med Name: Losartan Potassium Oral Tablet 25 MG] 90 tablet 0     Sig: Take 1 tablet by mouth daily     Last Filled:  3/13/24    Last appt: 1/10/2024   Next appt: Visit date not found    Last OARRS:        No data to display

## 2024-07-29 DIAGNOSIS — G43.009 MIGRAINE WITHOUT AURA AND WITHOUT STATUS MIGRAINOSUS, NOT INTRACTABLE: ICD-10-CM

## 2024-07-30 ENCOUNTER — PATIENT MESSAGE (OUTPATIENT)
Dept: FAMILY MEDICINE CLINIC | Age: 39
End: 2024-07-30

## 2024-07-30 DIAGNOSIS — G43.009 MIGRAINE WITHOUT AURA AND WITHOUT STATUS MIGRAINOSUS, NOT INTRACTABLE: ICD-10-CM

## 2024-07-30 RX ORDER — RIZATRIPTAN BENZOATE 10 MG/1
10 TABLET ORAL SEE ADMIN INSTRUCTIONS
Qty: 20 TABLET | Refills: 4 | Status: SHIPPED | OUTPATIENT
Start: 2024-07-30

## 2024-07-30 RX ORDER — RIZATRIPTAN BENZOATE 10 MG/1
TABLET ORAL
Qty: 20 TABLET | Refills: 0 | OUTPATIENT
Start: 2024-07-30

## 2024-07-30 NOTE — TELEPHONE ENCOUNTER
Medication:   Requested Prescriptions     Pending Prescriptions Disp Refills    rizatriptan (MAXALT) 10 MG tablet 20 tablet 4     Sig: Take 1 tablet by mouth See Admin Instructions for migraine        Last Filled:  #20 w/4 RF     Patient Phone Number: [phone] (home)     Last appt: 1/10/2024   Next appt: Visit date not found    Last OARRS:        No data to display

## 2024-07-30 NOTE — TELEPHONE ENCOUNTER
From: Maryann Hassan  To: Dr. Molly Cobb  Sent: 7/30/2024 11:35 AM EDT  Subject: rizatriptan    Morning, my refill was for only one pill. Can you approve the remaining 8?

## 2024-08-18 DIAGNOSIS — G43.009 MIGRAINE WITHOUT AURA AND WITHOUT STATUS MIGRAINOSUS, NOT INTRACTABLE: ICD-10-CM

## 2024-08-20 RX ORDER — IBUPROFEN 800 MG/1
800 TABLET ORAL EVERY 6 HOURS PRN
Qty: 30 TABLET | Refills: 0 | Status: SHIPPED | OUTPATIENT
Start: 2024-08-20

## 2024-08-20 NOTE — TELEPHONE ENCOUNTER
Medication:   Requested Prescriptions     Pending Prescriptions Disp Refills    ibuprofen (ADVIL;MOTRIN) 800 MG tablet [Pharmacy Med Name: Ibuprofen Oral Tablet 800 MG] 30 tablet 0     Sig: TAKE 1 TABLET BY MOUTH EVERY 6 HOURS AS NEEDED FOR PAIN        Last Filled:  09/18/2023 #30 2rf     Patient Phone Number: [phone] (home)     Last appt: 1/10/2024   Next appt: Visit date not found    Last OARRS:        No data to display

## 2024-09-10 RX ORDER — LOSARTAN POTASSIUM 25 MG/1
25 TABLET ORAL DAILY
Qty: 90 TABLET | Refills: 0 | Status: SHIPPED | OUTPATIENT
Start: 2024-09-10

## 2024-09-29 ENCOUNTER — PATIENT MESSAGE (OUTPATIENT)
Dept: FAMILY MEDICINE CLINIC | Age: 39
End: 2024-09-29

## 2024-09-29 DIAGNOSIS — K86.2 PANCREATIC CYST: Primary | ICD-10-CM

## 2024-10-06 ENCOUNTER — TELEPHONE (OUTPATIENT)
Dept: FAMILY MEDICINE CLINIC | Age: 39
End: 2024-10-06

## 2024-10-06 RX ORDER — CIPROFLOXACIN 500 MG/1
500 TABLET, FILM COATED ORAL 2 TIMES DAILY
Qty: 14 TABLET | Refills: 0 | Status: SHIPPED | OUTPATIENT
Start: 2024-10-06 | End: 2024-10-13

## 2024-10-06 RX ORDER — METRONIDAZOLE 500 MG/1
500 TABLET ORAL 2 TIMES DAILY
Qty: 14 TABLET | Refills: 0 | Status: SHIPPED | OUTPATIENT
Start: 2024-10-06 | End: 2024-10-13

## 2024-10-14 ENCOUNTER — HOSPITAL ENCOUNTER (OUTPATIENT)
Dept: ULTRASOUND IMAGING | Age: 39
Discharge: HOME | End: 2024-10-14
Attending: FAMILY MEDICINE
Payer: COMMERCIAL

## 2024-10-14 DIAGNOSIS — N63.0 FIBROUS BREAST LUMPS: ICD-10-CM

## 2024-10-14 PROCEDURE — 76642 ULTRASOUND BREAST LIMITED: CPT

## 2024-11-25 RX ORDER — BUPROPION HYDROCHLORIDE 150 MG/1
150 TABLET ORAL EVERY MORNING
Qty: 30 TABLET | Refills: 0 | Status: SHIPPED | OUTPATIENT
Start: 2024-11-25

## 2024-11-25 NOTE — TELEPHONE ENCOUNTER
Medication:   Requested Prescriptions     Pending Prescriptions Disp Refills    buPROPion (WELLBUTRIN XL) 150 MG extended release tablet [Pharmacy Med Name: buPROPion HCl ER (XL) Oral Tablet Extended Release 24 Hour 150 MG] 30 tablet 0     Sig: TAKE 1 TABLET BY MOUTH IN THE MORNING        Last Filled:  04/29/2024 #90 1rf    Patient Phone Number: [phone] (home)     Last appt: 1/10/2024   Next appt: Visit date not found    Last OARRS:        No data to display

## 2024-12-09 RX ORDER — LOSARTAN POTASSIUM 25 MG/1
25 TABLET ORAL DAILY
Qty: 90 TABLET | Refills: 0 | Status: SHIPPED | OUTPATIENT
Start: 2024-12-09

## 2024-12-09 NOTE — TELEPHONE ENCOUNTER
Medication:   Requested Prescriptions     Pending Prescriptions Disp Refills    losartan (COZAAR) 25 MG tablet [Pharmacy Med Name: Losartan Potassium Oral Tablet 25 MG] 90 tablet 0     Sig: Take 1 tablet by mouth daily        Last Filled:  09/10/2024 #90 0rf     Patient Phone Number: [phone] (home)     Last appt: 1/10/2024   Next appt: Visit date not found    Last OARRS:        No data to display

## 2024-12-24 RX ORDER — BUPROPION HYDROCHLORIDE 150 MG/1
150 TABLET ORAL EVERY MORNING
Qty: 30 TABLET | Refills: 0 | Status: SHIPPED | OUTPATIENT
Start: 2024-12-24

## 2024-12-24 NOTE — TELEPHONE ENCOUNTER
Medication:   Requested Prescriptions     Pending Prescriptions Disp Refills    buPROPion (WELLBUTRIN XL) 150 MG extended release tablet [Pharmacy Med Name: buPROPion HCl ER (XL) Oral Tablet Extended Release 24 Hour 150 MG] 30 tablet 0     Sig: TAKE 1 TABLET BY MOUTH IN THE MORNING        Last Filled:  11/25/2024 #30 0rf    Patient Phone Number: [phone] (home)     Last appt: 1/10/2024   Next appt: Visit date not found    Last OARRS:        No data to display

## 2025-01-12 ENCOUNTER — PATIENT MESSAGE (OUTPATIENT)
Dept: FAMILY MEDICINE CLINIC | Age: 40
End: 2025-01-12

## 2025-01-27 RX ORDER — BUPROPION HYDROCHLORIDE 150 MG/1
150 TABLET ORAL EVERY MORNING
Qty: 30 TABLET | Refills: 0 | Status: SHIPPED | OUTPATIENT
Start: 2025-01-27

## 2025-01-27 RX ORDER — BUPROPION HYDROCHLORIDE 150 MG/1
150 TABLET ORAL EVERY MORNING
Qty: 30 TABLET | Refills: 0 | Status: SHIPPED | OUTPATIENT
Start: 2025-01-27 | End: 2025-01-27

## 2025-01-27 NOTE — TELEPHONE ENCOUNTER
Medication:   Requested Prescriptions     Pending Prescriptions Disp Refills    buPROPion (WELLBUTRIN XL) 150 MG extended release tablet [Pharmacy Med Name: buPROPion HCl ER (XL) Oral Tablet Extended Release 24 Hour 150 MG] 30 tablet 0     Sig: TAKE 1 TABLET BY MOUTH IN THE MORNING        Last Filled:  12/24/2024    Patient Phone Number: [phone] (home)     Last appt: 1/10/2024   Next appt: Visit date not found    Last OARRS:        No data to display

## 2025-01-27 NOTE — TELEPHONE ENCOUNTER
Medication:   Requested Prescriptions     Pending Prescriptions Disp Refills    buPROPion (WELLBUTRIN XL) 150 MG extended release tablet [Pharmacy Med Name: buPROPion HCl ER (XL) Oral Tablet Extended Release 24 Hour 150 MG] 30 tablet 0     Sig: TAKE 1 TABLET BY MOUTH IN THE MORNING        Last Filled:  1/27/2025    Patient Phone Number: [phone] (home)     Last appt: 1/10/2024   Next appt: Visit date not found    Last OARRS:        No data to display

## 2025-02-03 ENCOUNTER — HOSPITAL ENCOUNTER (OUTPATIENT)
Dept: WOMENS IMAGING | Age: 40
Discharge: HOME | End: 2025-02-03
Attending: FAMILY MEDICINE
Payer: COMMERCIAL

## 2025-02-03 VITALS — WEIGHT: 136 LBS | HEIGHT: 65 IN | BODY MASS INDEX: 22.66 KG/M2

## 2025-02-03 DIAGNOSIS — Z12.31 ENCOUNTER FOR SCREENING MAMMOGRAM FOR MALIGNANT NEOPLASM OF BREAST: ICD-10-CM

## 2025-02-03 PROCEDURE — 77063 BREAST TOMOSYNTHESIS BI: CPT

## 2025-02-19 DIAGNOSIS — G43.009 MIGRAINE WITHOUT AURA AND WITHOUT STATUS MIGRAINOSUS, NOT INTRACTABLE: ICD-10-CM

## 2025-02-19 RX ORDER — RIZATRIPTAN BENZOATE 10 MG/1
TABLET ORAL
Qty: 20 TABLET | Refills: 0 | Status: SHIPPED | OUTPATIENT
Start: 2025-02-19

## 2025-02-19 NOTE — TELEPHONE ENCOUNTER
Medication:   Requested Prescriptions     Pending Prescriptions Disp Refills    rizatriptan (MAXALT) 10 MG tablet [Pharmacy Med Name: Rizatriptan Benzoate Oral Tablet 10 MG] 20 tablet 0     Sig: take 1 tablet by mouth at onset of migraine, may take a second dose at least 2 hours later if migraine returns and first dose had a response. max 3 tabs in 24 hour        Last Filled:  07/30/2024 #20 4rf    Patient Phone Number: [phone] (home)     Last appt: 1/10/2024   Next appt: Visit date not found    Last OARRS:        No data to display

## 2025-03-03 RX ORDER — LOSARTAN POTASSIUM 25 MG/1
25 TABLET ORAL DAILY
Qty: 90 TABLET | Refills: 0 | Status: SHIPPED | OUTPATIENT
Start: 2025-03-03

## 2025-03-03 NOTE — TELEPHONE ENCOUNTER
Medication:   Requested Prescriptions     Pending Prescriptions Disp Refills    losartan (COZAAR) 25 MG tablet [Pharmacy Med Name: Losartan Potassium Oral Tablet 25 MG] 90 tablet 0     Sig: TAKE 1 TABLET BY MOUTH EVERY DAY        Last Filled:  12/09/2024 #90 0rf     Patient Phone Number: [phone] (home)     Last appt: 1/10/2024   Next appt: Visit date not found    Last OARRS:        No data to display

## 2025-03-04 ASSESSMENT — PATIENT HEALTH QUESTIONNAIRE - PHQ9
2. FEELING DOWN, DEPRESSED OR HOPELESS: SEVERAL DAYS
3. TROUBLE FALLING OR STAYING ASLEEP: SEVERAL DAYS
4. FEELING TIRED OR HAVING LITTLE ENERGY: SEVERAL DAYS
8. MOVING OR SPEAKING SO SLOWLY THAT OTHER PEOPLE COULD HAVE NOTICED. OR THE OPPOSITE, BEING SO FIGETY OR RESTLESS THAT YOU HAVE BEEN MOVING AROUND A LOT MORE THAN USUAL: NOT AT ALL
10. IF YOU CHECKED OFF ANY PROBLEMS, HOW DIFFICULT HAVE THESE PROBLEMS MADE IT FOR YOU TO DO YOUR WORK, TAKE CARE OF THINGS AT HOME, OR GET ALONG WITH OTHER PEOPLE: NOT DIFFICULT AT ALL
1. LITTLE INTEREST OR PLEASURE IN DOING THINGS: SEVERAL DAYS
7. TROUBLE CONCENTRATING ON THINGS, SUCH AS READING THE NEWSPAPER OR WATCHING TELEVISION: NOT AT ALL
2. FEELING DOWN, DEPRESSED OR HOPELESS: SEVERAL DAYS
SUM OF ALL RESPONSES TO PHQ QUESTIONS 1-9: 4
5. POOR APPETITE OR OVEREATING: NOT AT ALL
SUM OF ALL RESPONSES TO PHQ9 QUESTIONS 1 & 2: 2
6. FEELING BAD ABOUT YOURSELF - OR THAT YOU ARE A FAILURE OR HAVE LET YOURSELF OR YOUR FAMILY DOWN: NOT AT ALL
SUM OF ALL RESPONSES TO PHQ QUESTIONS 1-9: 4
SUM OF ALL RESPONSES TO PHQ QUESTIONS 1-9: 4
9. THOUGHTS THAT YOU WOULD BE BETTER OFF DEAD, OR OF HURTING YOURSELF: NOT AT ALL
1. LITTLE INTEREST OR PLEASURE IN DOING THINGS: SEVERAL DAYS
SUM OF ALL RESPONSES TO PHQ QUESTIONS 1-9: 4

## 2025-03-04 NOTE — TELEPHONE ENCOUNTER
Contacted patient and she was unable to tell the provider she is wanting but she stated it was the doctor who took over for Dr. Cormier at  gastroenterology.   Patient scheduled.    Recent Visits  Date Type Provider Dept   01/10/24 Office Visit Molly Cobb MD [facility]   Showing recent visits within past 540 days with a meds authorizing provider and meeting all other requirements  Future Appointments  Date Type Provider Dept   03/10/25 Appointment Molly Cobb MD [facility]   Showing future appointments within next 150 days with a meds authorizing provider and meeting all other requirements

## 2025-03-07 RX ORDER — LACTULOSE 10 G/15ML
SOLUTION ORAL
COMMUNITY
Start: 2025-03-01

## 2025-03-07 RX ORDER — CALCIUM POLYCARBOPHIL 625 MG
TABLET ORAL DAILY
COMMUNITY

## 2025-03-10 ENCOUNTER — OFFICE VISIT (OUTPATIENT)
Dept: FAMILY MEDICINE CLINIC | Age: 40
End: 2025-03-10
Payer: COMMERCIAL

## 2025-03-10 VITALS
BODY MASS INDEX: 22.96 KG/M2 | WEIGHT: 137.8 LBS | DIASTOLIC BLOOD PRESSURE: 85 MMHG | SYSTOLIC BLOOD PRESSURE: 130 MMHG | HEART RATE: 74 BPM | TEMPERATURE: 98.4 F | HEIGHT: 65 IN

## 2025-03-10 DIAGNOSIS — I10 PRIMARY HYPERTENSION: ICD-10-CM

## 2025-03-10 DIAGNOSIS — Z00.00 ANNUAL PHYSICAL EXAM: Primary | ICD-10-CM

## 2025-03-10 PROCEDURE — 3079F DIAST BP 80-89 MM HG: CPT | Performed by: FAMILY MEDICINE

## 2025-03-10 PROCEDURE — 3075F SYST BP GE 130 - 139MM HG: CPT | Performed by: FAMILY MEDICINE

## 2025-03-10 PROCEDURE — 99395 PREV VISIT EST AGE 18-39: CPT | Performed by: FAMILY MEDICINE

## 2025-03-10 PROCEDURE — 99213 OFFICE O/P EST LOW 20 MIN: CPT | Performed by: FAMILY MEDICINE

## 2025-03-10 RX ORDER — SACCHAROMYCES BOULARDII 250 MG
250 CAPSULE ORAL 2 TIMES DAILY
COMMUNITY

## 2025-03-10 SDOH — ECONOMIC STABILITY: FOOD INSECURITY: WITHIN THE PAST 12 MONTHS, YOU WORRIED THAT YOUR FOOD WOULD RUN OUT BEFORE YOU GOT MONEY TO BUY MORE.: NEVER TRUE

## 2025-03-10 SDOH — ECONOMIC STABILITY: FOOD INSECURITY: WITHIN THE PAST 12 MONTHS, THE FOOD YOU BOUGHT JUST DIDN'T LAST AND YOU DIDN'T HAVE MONEY TO GET MORE.: NEVER TRUE

## 2025-03-10 ASSESSMENT — ANXIETY QUESTIONNAIRES
4. TROUBLE RELAXING: NOT AT ALL
IF YOU CHECKED OFF ANY PROBLEMS ON THIS QUESTIONNAIRE, HOW DIFFICULT HAVE THESE PROBLEMS MADE IT FOR YOU TO DO YOUR WORK, TAKE CARE OF THINGS AT HOME, OR GET ALONG WITH OTHER PEOPLE: NOT DIFFICULT AT ALL
7. FEELING AFRAID AS IF SOMETHING AWFUL MIGHT HAPPEN: NOT AT ALL
5. BEING SO RESTLESS THAT IT IS HARD TO SIT STILL: SEVERAL DAYS
3. WORRYING TOO MUCH ABOUT DIFFERENT THINGS: SEVERAL DAYS
2. NOT BEING ABLE TO STOP OR CONTROL WORRYING: SEVERAL DAYS
GAD7 TOTAL SCORE: 5
1. FEELING NERVOUS, ANXIOUS, OR ON EDGE: SEVERAL DAYS
6. BECOMING EASILY ANNOYED OR IRRITABLE: SEVERAL DAYS

## 2025-03-11 NOTE — PROGRESS NOTES
Chief Complaint: Annual Exam, Hypertension, and Anxiety (ANDRES-7 Total Score: 5 //Depression PHQ-9 Total Score: 4 3/4/2025 7:59 AM)       HPI: She is here for annual physical exam.  She has history of hypertension currently controlled with losartan 25 mg daily.  She has discontinued all the medications for her anxiety and has been doing good without medication.    She had normal mammogram.  Up-to-date with GYN exam.  No other concern.    She has currently no flareup of diverticulitis.  She has leukopenia and she is due for blood work.  She has seen hematologist was reassured.  She had pancreatic cyst which is being monitored.  Currently no concerns.      Patient's problem list, medications, allergies, past medical, surgical, social and family histories were reviewed and updated as appropriate.     Current Outpatient Medications   Medication Sig Dispense Refill    saccharomyces boulardii (FLORASTOR) 250 MG capsule Take 1 capsule by mouth 2 times daily      lactulose (CHRONULAC) 10 GM/15ML solution       Calcium Polycarbophil (FIBER) 625 MG TABS Take by mouth daily      losartan (COZAAR) 25 MG tablet TAKE 1 TABLET BY MOUTH EVERY DAY 90 tablet 0    rizatriptan (MAXALT) 10 MG tablet take 1 tablet by mouth at onset of migraine, may take a second dose at least 2 hours later if migraine returns and first dose had a response. max 3 tabs in 24 hour 20 tablet 0    escitalopram (LEXAPRO) 20 MG tablet Take 1 tablet by mouth daily 90 tablet 0    Levonorgestrel (MIRENA, 52 MG, IU)        No current facility-administered medications for this visit.       Social History     Tobacco Use    Smoking status: Never    Smokeless tobacco: Never   Substance Use Topics    Alcohol use: No            Review of Systems:  Constitutional: Negative for appetite change, fatigue, fever and unexpected weight change.   HENT: Negative   Respiratory: Negative for cough, chest tightness, shortness of breath and wheezing.    Cardiovascular: Negative for

## 2025-03-20 DIAGNOSIS — G43.009 MIGRAINE WITHOUT AURA AND WITHOUT STATUS MIGRAINOSUS, NOT INTRACTABLE: ICD-10-CM

## 2025-03-20 RX ORDER — RIZATRIPTAN BENZOATE 10 MG/1
TABLET ORAL
Qty: 20 TABLET | Refills: 0 | Status: SHIPPED | OUTPATIENT
Start: 2025-03-20

## 2025-03-20 NOTE — TELEPHONE ENCOUNTER
Medication:   Requested Prescriptions     Pending Prescriptions Disp Refills    rizatriptan (MAXALT) 10 MG tablet [Pharmacy Med Name: Rizatriptan Benzoate Oral Tablet 10 MG] 20 tablet 0     Sig: TAKE 1 TABLET BY MOUTH AT ONSET OF MIGRAINE. MAY REPEAT IN 2 HOURS IF MIGRAINE RETURNS AND 1ST DOSE HAD A RESPONSE. MAX OF 3 TABS IN 24 HOURS.        Last Filled:  02/19/2025 #20 0rf     Patient Phone Number: [phone] (home)     Last appt: 3/10/2025   Next appt: Visit date not found    Last OARRS:        No data to display

## 2025-04-01 ENCOUNTER — PATIENT MESSAGE (OUTPATIENT)
Dept: FAMILY MEDICINE CLINIC | Age: 40
End: 2025-04-01

## 2025-04-01 NOTE — TELEPHONE ENCOUNTER
Patient scheduled.    Recent Visits  Date Type Provider Dept   03/10/25 Office Visit Molly Cobb MD [facility]   01/10/24 Office Visit Molly Cobb MD [facility]   Showing recent visits within past 540 days with a meds authorizing provider and meeting all other requirements  Future Appointments  Date Type Provider Dept   04/02/25 Appointment Molly Cobb MD [facility]   Showing future appointments within next 150 days with a meds authorizing provider and meeting all other requirements

## 2025-04-01 NOTE — TELEPHONE ENCOUNTER
Please advise; thank you!    Patient Phone Number: [phone] (home)     Last appt: 3/10/2025   Next appt: Visit date not found

## 2025-04-02 ENCOUNTER — PATIENT MESSAGE (OUTPATIENT)
Dept: FAMILY MEDICINE CLINIC | Age: 40
End: 2025-04-02

## 2025-04-02 ENCOUNTER — TELEMEDICINE (OUTPATIENT)
Dept: FAMILY MEDICINE CLINIC | Age: 40
End: 2025-04-02
Payer: COMMERCIAL

## 2025-04-02 DIAGNOSIS — I10 PRIMARY HYPERTENSION: Primary | ICD-10-CM

## 2025-04-02 DIAGNOSIS — G43.009 MIGRAINE WITHOUT AURA AND WITHOUT STATUS MIGRAINOSUS, NOT INTRACTABLE: ICD-10-CM

## 2025-04-02 PROCEDURE — G2211 COMPLEX E/M VISIT ADD ON: HCPCS | Performed by: FAMILY MEDICINE

## 2025-04-02 PROCEDURE — 99214 OFFICE O/P EST MOD 30 MIN: CPT | Performed by: FAMILY MEDICINE

## 2025-04-02 RX ORDER — PROPRANOLOL HCL 20 MG
20 TABLET ORAL 2 TIMES DAILY
Qty: 60 TABLET | Refills: 5 | Status: SHIPPED | OUTPATIENT
Start: 2025-04-02 | End: 2025-05-02

## 2025-04-02 RX ORDER — RIZATRIPTAN BENZOATE 10 MG/1
10 TABLET ORAL
Qty: 30 TABLET | Refills: 4 | Status: SHIPPED | OUTPATIENT
Start: 2025-04-02 | End: 2025-04-03

## 2025-04-02 NOTE — PROGRESS NOTES
2025    TELEHEALTH EVALUATION -- Audio/Visual over the clinic    HPI:    Maryann Hassan (:  1985) has requested an audio/video evaluation for the following concern(s): Worsening of her headaches.  She has history of migraines.  She has been having increased frequency of migraines.  She has been taking Maxalt 10 mg during that time.  Sometimes she is getting the headaches 3 or 4 times in a row.  She has IUD and not really sure if it is during her menstrual cycle.  She denies having any increased stress or change in her lifestyle.  She has history of hypertension and taking losartan 25 mg daily.  Her blood pressures have been stable.    Review of Systems:  Gen:  Denies fever, chills, headaches. No weight loss  HEENT:  Denies cold symptoms, sore throat.  CV:  Denies chest pain or tightness, palpitations.  Pulm:  Denies shortness of breath, cough.  Abd:  Denies abdominal pain, change in bowel habits.    Prior to Visit Medications    Medication Sig Taking? Authorizing Provider   rizatriptan (MAXALT) 10 MG tablet Take 1 tablet by mouth once as needed for Migraine May repeat in 2 hours if needed Yes Molly Cobb MD   propranolol (INDERAL) 20 MG tablet Take 1 tablet by mouth 2 times daily Yes Molly Cobb MD   saccharomyces boulardii (FLORASTOR) 250 MG capsule Take 1 capsule by mouth 2 times daily Yes ProviderAnnalee MD   lactulose (CHRONULAC) 10 GM/15ML solution  Yes Annalee Ta MD   Calcium Polycarbophil (FIBER) 625 MG TABS Take by mouth daily Yes Annalee Ta MD   Levonorgestrel (MIRENA, 52 MG, IU)  Yes ProviderAnnalee MD       Past Medical History:   Diagnosis Date    Anxiety 218-25    Stopped medication    Hypertension     hellp syndrome with 3 yo    Influenza 2018    Migraines        Past Surgical History:   Procedure Laterality Date     SECTION, LOW TRANSVERSE  2014    EYE SURGERY  2017    lasik    WISDOM TOOTH EXTRACTION         Family

## 2025-04-02 NOTE — TELEPHONE ENCOUNTER
Pharmacy comment: Alternative Requested:[facility] WANTS PA.   Looks like insurance wants a PA done for the medication or an alternative sent in, please advise, thank you

## 2025-04-02 NOTE — TELEPHONE ENCOUNTER
Why is the alterative requested is it because patient wanted or is it because maxalt not covered by your insurance?

## 2025-04-03 RX ORDER — ZOLMITRIPTAN 5 MG/1
5 TABLET, FILM COATED ORAL PRN
Qty: 30 TABLET | Refills: 4 | Status: SHIPPED | OUTPATIENT
Start: 2025-04-03 | End: 2025-05-03

## 2025-07-03 ENCOUNTER — PATIENT MESSAGE (OUTPATIENT)
Dept: FAMILY MEDICINE CLINIC | Age: 40
End: 2025-07-03

## 2025-07-03 DIAGNOSIS — R21 RASH: Primary | ICD-10-CM

## 2025-07-29 DIAGNOSIS — G43.009 MIGRAINE WITHOUT AURA AND WITHOUT STATUS MIGRAINOSUS, NOT INTRACTABLE: ICD-10-CM

## 2025-07-29 RX ORDER — PROPRANOLOL HCL 20 MG
20 TABLET ORAL 2 TIMES DAILY
Qty: 60 TABLET | Refills: 5 | Status: SHIPPED | OUTPATIENT
Start: 2025-07-29 | End: 2025-08-28

## 2025-07-29 NOTE — TELEPHONE ENCOUNTER
Medication:   Requested Prescriptions     Pending Prescriptions Disp Refills    propranolol (INDERAL) 20 MG immediate release tablet 60 tablet 5     Sig: Take 1 tablet by mouth 2 times daily        Last Filled:  04/02/2025 #[id number]     Patient Phone Number: [phone] (home)     Last appt: 4/2/2025   Next appt: Visit date not found    Last OARRS:        No data to display